# Patient Record
Sex: FEMALE | Race: WHITE | NOT HISPANIC OR LATINO | Employment: FULL TIME | URBAN - NONMETROPOLITAN AREA
[De-identification: names, ages, dates, MRNs, and addresses within clinical notes are randomized per-mention and may not be internally consistent; named-entity substitution may affect disease eponyms.]

---

## 2018-12-02 ENCOUNTER — HOSPITAL ENCOUNTER (OUTPATIENT)
Facility: HOSPITAL | Age: 38
Setting detail: OBSERVATION
Discharge: HOME | End: 2018-12-03
Attending: EMERGENCY MEDICINE | Admitting: INTERNAL MEDICINE
Payer: COMMERCIAL

## 2018-12-02 ENCOUNTER — APPOINTMENT (EMERGENCY)
Dept: CT IMAGING | Facility: HOSPITAL | Age: 38
End: 2018-12-02
Payer: COMMERCIAL

## 2018-12-02 DIAGNOSIS — D72.829 LEUKOCYTOSIS: ICD-10-CM

## 2018-12-02 DIAGNOSIS — R07.9 CHEST PAIN: Primary | ICD-10-CM

## 2018-12-02 DIAGNOSIS — E87.6 HYPOKALEMIA: ICD-10-CM

## 2018-12-02 PROBLEM — M54.50 CHRONIC LOW BACK PAIN: Status: ACTIVE | Noted: 2018-12-02

## 2018-12-02 PROBLEM — G89.29 CHRONIC LOW BACK PAIN: Status: ACTIVE | Noted: 2018-12-02

## 2018-12-02 LAB
ALBUMIN SERPL BCP-MCNC: 4.1 G/DL (ref 3.5–5)
ALP SERPL-CCNC: 67 U/L (ref 46–116)
ALT SERPL W P-5'-P-CCNC: 26 U/L (ref 12–78)
ANION GAP SERPL CALCULATED.3IONS-SCNC: 10 MMOL/L (ref 4–13)
APTT PPP: 29 SECONDS (ref 26–38)
AST SERPL W P-5'-P-CCNC: 16 U/L (ref 5–45)
BASOPHILS # BLD AUTO: 0.1 THOUSANDS/ΜL (ref 0–0.1)
BASOPHILS NFR BLD AUTO: 1 % (ref 0–1)
BILIRUB SERPL-MCNC: 0.2 MG/DL (ref 0.2–1)
BUN SERPL-MCNC: 17 MG/DL (ref 5–25)
CALCIUM SERPL-MCNC: 8.6 MG/DL (ref 8.3–10.1)
CHLORIDE SERPL-SCNC: 102 MMOL/L (ref 100–108)
CO2 SERPL-SCNC: 28 MMOL/L (ref 21–32)
CREAT SERPL-MCNC: 0.93 MG/DL (ref 0.6–1.3)
EOSINOPHIL # BLD AUTO: 0.33 THOUSAND/ΜL (ref 0–0.61)
EOSINOPHIL NFR BLD AUTO: 2 % (ref 0–6)
ERYTHROCYTE [DISTWIDTH] IN BLOOD BY AUTOMATED COUNT: 13.2 % (ref 11.6–15.1)
EXT PREG TEST URINE: NEGATIVE
GFR SERPL CREATININE-BSD FRML MDRD: 79 ML/MIN/1.73SQ M
GLUCOSE SERPL-MCNC: 122 MG/DL (ref 65–140)
HCT VFR BLD AUTO: 39.8 % (ref 34.8–46.1)
HGB BLD-MCNC: 13.3 G/DL (ref 11.5–15.4)
IMM GRANULOCYTES # BLD AUTO: 0.08 THOUSAND/UL (ref 0–0.2)
IMM GRANULOCYTES NFR BLD AUTO: 0 % (ref 0–2)
INR PPP: 0.9 (ref 0.86–1.17)
LIPASE SERPL-CCNC: 164 U/L (ref 73–393)
LYMPHOCYTES # BLD AUTO: 3.62 THOUSANDS/ΜL (ref 0.6–4.47)
LYMPHOCYTES NFR BLD AUTO: 19 % (ref 14–44)
MCH RBC QN AUTO: 30.7 PG (ref 26.8–34.3)
MCHC RBC AUTO-ENTMCNC: 33.4 G/DL (ref 31.4–37.4)
MCV RBC AUTO: 92 FL (ref 82–98)
MONOCYTES # BLD AUTO: 0.83 THOUSAND/ΜL (ref 0.17–1.22)
MONOCYTES NFR BLD AUTO: 4 % (ref 4–12)
NEUTROPHILS # BLD AUTO: 14.09 THOUSANDS/ΜL (ref 1.85–7.62)
NEUTS SEG NFR BLD AUTO: 74 % (ref 43–75)
NRBC BLD AUTO-RTO: 0 /100 WBCS
PLATELET # BLD AUTO: 405 THOUSANDS/UL (ref 149–390)
PMV BLD AUTO: 10.1 FL (ref 8.9–12.7)
POTASSIUM SERPL-SCNC: 3.3 MMOL/L (ref 3.5–5.3)
PROT SERPL-MCNC: 7.6 G/DL (ref 6.4–8.2)
PROTHROMBIN TIME: 11.7 SECONDS (ref 11.8–14.2)
RBC # BLD AUTO: 4.33 MILLION/UL (ref 3.81–5.12)
SODIUM SERPL-SCNC: 140 MMOL/L (ref 136–145)
TROPONIN I SERPL-MCNC: <0.02 NG/ML
WBC # BLD AUTO: 19.05 THOUSAND/UL (ref 4.31–10.16)

## 2018-12-02 PROCEDURE — 85025 COMPLETE CBC W/AUTO DIFF WBC: CPT | Performed by: EMERGENCY MEDICINE

## 2018-12-02 PROCEDURE — 83690 ASSAY OF LIPASE: CPT | Performed by: EMERGENCY MEDICINE

## 2018-12-02 PROCEDURE — 36415 COLL VENOUS BLD VENIPUNCTURE: CPT | Performed by: EMERGENCY MEDICINE

## 2018-12-02 PROCEDURE — 85610 PROTHROMBIN TIME: CPT | Performed by: EMERGENCY MEDICINE

## 2018-12-02 PROCEDURE — 81025 URINE PREGNANCY TEST: CPT | Performed by: EMERGENCY MEDICINE

## 2018-12-02 PROCEDURE — 99285 EMERGENCY DEPT VISIT HI MDM: CPT

## 2018-12-02 PROCEDURE — 84484 ASSAY OF TROPONIN QUANT: CPT | Performed by: EMERGENCY MEDICINE

## 2018-12-02 PROCEDURE — 93005 ELECTROCARDIOGRAM TRACING: CPT

## 2018-12-02 PROCEDURE — 80053 COMPREHEN METABOLIC PANEL: CPT | Performed by: EMERGENCY MEDICINE

## 2018-12-02 PROCEDURE — 71275 CT ANGIOGRAPHY CHEST: CPT

## 2018-12-02 PROCEDURE — 85730 THROMBOPLASTIN TIME PARTIAL: CPT | Performed by: EMERGENCY MEDICINE

## 2018-12-02 PROCEDURE — 74177 CT ABD & PELVIS W/CONTRAST: CPT

## 2018-12-02 PROCEDURE — 96360 HYDRATION IV INFUSION INIT: CPT

## 2018-12-02 PROCEDURE — 99220 PR INITIAL OBSERVATION CARE/DAY 70 MINUTES: CPT | Performed by: NURSE PRACTITIONER

## 2018-12-02 RX ORDER — VITAMIN B COMPLEX
1 CAPSULE ORAL DAILY
COMMUNITY
End: 2020-03-02 | Stop reason: HOSPADM

## 2018-12-02 RX ORDER — ASPIRIN 81 MG/1
324 TABLET, CHEWABLE ORAL ONCE
Status: COMPLETED | OUTPATIENT
Start: 2018-12-02 | End: 2018-12-02

## 2018-12-02 RX ORDER — POTASSIUM CHLORIDE 14.9 MG/ML
20 INJECTION INTRAVENOUS ONCE
Status: COMPLETED | OUTPATIENT
Start: 2018-12-02 | End: 2018-12-03

## 2018-12-02 RX ORDER — ACETAMINOPHEN AND CODEINE PHOSPHATE 300; 30 MG/1; MG/1
1 TABLET ORAL EVERY 4 HOURS PRN
COMMUNITY
End: 2021-04-06

## 2018-12-02 RX ADMIN — SODIUM CHLORIDE 1000 ML: 0.9 INJECTION, SOLUTION INTRAVENOUS at 22:06

## 2018-12-02 RX ADMIN — IOHEXOL 100 ML: 350 INJECTION, SOLUTION INTRAVENOUS at 22:50

## 2018-12-02 RX ADMIN — ASPIRIN 81 MG 324 MG: 81 TABLET ORAL at 21:44

## 2018-12-02 RX ADMIN — NITROGLYCERIN 0.5 INCH: 20 OINTMENT TOPICAL at 21:45

## 2018-12-02 NOTE — LETTER
[facility]  SURGICAL UNIT  [address] [facility] [address]  Dept: [phone]    December 3, 2018     Patient: Fernanda Bravo   YOB: 1980   Date of Visit: 12/2/2018       To Whom it May Concern:    Fernanda Bravo is under my professional care  She was seen in the hospital from 12/2/2018   to 12/03/18  She may return to work on 12/4/18  If you have any questions or concerns, please don't hesitate to call           Sincerely,          Hunter Roca PA-C

## 2018-12-03 ENCOUNTER — APPOINTMENT (OUTPATIENT)
Dept: NON INVASIVE DIAGNOSTICS | Facility: HOSPITAL | Age: 38
End: 2018-12-03
Payer: COMMERCIAL

## 2018-12-03 VITALS
SYSTOLIC BLOOD PRESSURE: 91 MMHG | OXYGEN SATURATION: 97 % | RESPIRATION RATE: 18 BRPM | DIASTOLIC BLOOD PRESSURE: 57 MMHG | WEIGHT: 178.35 LBS | HEART RATE: 60 BPM | BODY MASS INDEX: 32.82 KG/M2 | HEIGHT: 62 IN | TEMPERATURE: 97.2 F

## 2018-12-03 LAB
ANION GAP SERPL CALCULATED.3IONS-SCNC: 7 MMOL/L (ref 4–13)
ATRIAL RATE: 99 BPM
BACTERIA UR QL AUTO: NORMAL /HPF
BASOPHILS # BLD AUTO: 0.07 THOUSANDS/ΜL (ref 0–0.1)
BASOPHILS NFR BLD AUTO: 0 % (ref 0–1)
BILIRUB UR QL STRIP: NEGATIVE
BUN SERPL-MCNC: 16 MG/DL (ref 5–25)
CALCIUM SERPL-MCNC: 7.9 MG/DL (ref 8.3–10.1)
CHLORIDE SERPL-SCNC: 109 MMOL/L (ref 100–108)
CLARITY UR: CLEAR
CO2 SERPL-SCNC: 27 MMOL/L (ref 21–32)
COLOR UR: COLORLESS
CREAT SERPL-MCNC: 0.89 MG/DL (ref 0.6–1.3)
EOSINOPHIL # BLD AUTO: 0.37 THOUSAND/ΜL (ref 0–0.61)
EOSINOPHIL NFR BLD AUTO: 2 % (ref 0–6)
ERYTHROCYTE [DISTWIDTH] IN BLOOD BY AUTOMATED COUNT: 13.3 % (ref 11.6–15.1)
GFR SERPL CREATININE-BSD FRML MDRD: 83 ML/MIN/1.73SQ M
GLUCOSE SERPL-MCNC: 97 MG/DL (ref 65–140)
GLUCOSE UR STRIP-MCNC: NEGATIVE MG/DL
HCT VFR BLD AUTO: 37.8 % (ref 34.8–46.1)
HGB BLD-MCNC: 12 G/DL (ref 11.5–15.4)
HGB UR QL STRIP.AUTO: NEGATIVE
IMM GRANULOCYTES # BLD AUTO: 0.06 THOUSAND/UL (ref 0–0.2)
IMM GRANULOCYTES NFR BLD AUTO: 0 % (ref 0–2)
KETONES UR STRIP-MCNC: NEGATIVE MG/DL
LEUKOCYTE ESTERASE UR QL STRIP: NEGATIVE
LYMPHOCYTES # BLD AUTO: 4.1 THOUSANDS/ΜL (ref 0.6–4.47)
LYMPHOCYTES NFR BLD AUTO: 25 % (ref 14–44)
MAGNESIUM SERPL-MCNC: 2.1 MG/DL (ref 1.6–2.6)
MCH RBC QN AUTO: 30 PG (ref 26.8–34.3)
MCHC RBC AUTO-ENTMCNC: 31.7 G/DL (ref 31.4–37.4)
MCV RBC AUTO: 95 FL (ref 82–98)
MONOCYTES # BLD AUTO: 1.03 THOUSAND/ΜL (ref 0.17–1.22)
MONOCYTES NFR BLD AUTO: 6 % (ref 4–12)
NEUTROPHILS # BLD AUTO: 11 THOUSANDS/ΜL (ref 1.85–7.62)
NEUTS SEG NFR BLD AUTO: 67 % (ref 43–75)
NITRITE UR QL STRIP: NEGATIVE
NON-SQ EPI CELLS URNS QL MICRO: NORMAL /HPF
NRBC BLD AUTO-RTO: 0 /100 WBCS
NT-PROBNP SERPL-MCNC: 26 PG/ML
P AXIS: 60 DEGREES
PH UR STRIP.AUTO: 6 [PH] (ref 4.5–8)
PHOSPHATE SERPL-MCNC: 3 MG/DL (ref 2.7–4.5)
PLATELET # BLD AUTO: 341 THOUSANDS/UL (ref 149–390)
PMV BLD AUTO: 10.2 FL (ref 8.9–12.7)
POTASSIUM SERPL-SCNC: 4.5 MMOL/L (ref 3.5–5.3)
PR INTERVAL: 146 MS
PROCALCITONIN SERPL-MCNC: <0.05 NG/ML
PROT UR STRIP-MCNC: NEGATIVE MG/DL
QRS AXIS: 67 DEGREES
QRSD INTERVAL: 90 MS
QT INTERVAL: 360 MS
QTC INTERVAL: 462 MS
RBC # BLD AUTO: 4 MILLION/UL (ref 3.81–5.12)
RBC #/AREA URNS AUTO: NORMAL /HPF
SODIUM SERPL-SCNC: 143 MMOL/L (ref 136–145)
SP GR UR STRIP.AUTO: 1.01 (ref 1–1.03)
T WAVE AXIS: 59 DEGREES
TROPONIN I SERPL-MCNC: <0.02 NG/ML
TROPONIN I SERPL-MCNC: <0.02 NG/ML
UROBILINOGEN UR QL STRIP.AUTO: 0.2 E.U./DL
VENTRICULAR RATE: 99 BPM
WBC # BLD AUTO: 16.63 THOUSAND/UL (ref 4.31–10.16)
WBC #/AREA URNS AUTO: NORMAL /HPF

## 2018-12-03 PROCEDURE — 83880 ASSAY OF NATRIURETIC PEPTIDE: CPT | Performed by: NURSE PRACTITIONER

## 2018-12-03 PROCEDURE — 87086 URINE CULTURE/COLONY COUNT: CPT | Performed by: INTERNAL MEDICINE

## 2018-12-03 PROCEDURE — 87040 BLOOD CULTURE FOR BACTERIA: CPT | Performed by: INTERNAL MEDICINE

## 2018-12-03 PROCEDURE — 83735 ASSAY OF MAGNESIUM: CPT | Performed by: NURSE PRACTITIONER

## 2018-12-03 PROCEDURE — 84100 ASSAY OF PHOSPHORUS: CPT | Performed by: NURSE PRACTITIONER

## 2018-12-03 PROCEDURE — 85025 COMPLETE CBC W/AUTO DIFF WBC: CPT | Performed by: NURSE PRACTITIONER

## 2018-12-03 PROCEDURE — 84145 PROCALCITONIN (PCT): CPT | Performed by: NURSE PRACTITIONER

## 2018-12-03 PROCEDURE — 81001 URINALYSIS AUTO W/SCOPE: CPT | Performed by: INTERNAL MEDICINE

## 2018-12-03 PROCEDURE — 93306 TTE W/DOPPLER COMPLETE: CPT | Performed by: INTERNAL MEDICINE

## 2018-12-03 PROCEDURE — 93306 TTE W/DOPPLER COMPLETE: CPT

## 2018-12-03 PROCEDURE — 99217 PR OBSERVATION CARE DISCHARGE MANAGEMENT: CPT | Performed by: PHYSICIAN ASSISTANT

## 2018-12-03 PROCEDURE — 80048 BASIC METABOLIC PNL TOTAL CA: CPT | Performed by: NURSE PRACTITIONER

## 2018-12-03 PROCEDURE — 93010 ELECTROCARDIOGRAM REPORT: CPT | Performed by: INTERNAL MEDICINE

## 2018-12-03 PROCEDURE — 84484 ASSAY OF TROPONIN QUANT: CPT | Performed by: NURSE PRACTITIONER

## 2018-12-03 RX ORDER — FAMOTIDINE 20 MG/1
20 TABLET, FILM COATED ORAL DAILY
Status: DISCONTINUED | OUTPATIENT
Start: 2018-12-03 | End: 2018-12-03 | Stop reason: HOSPADM

## 2018-12-03 RX ADMIN — POTASSIUM CHLORIDE 20 MEQ: 200 INJECTION, SOLUTION INTRAVENOUS at 00:31

## 2018-12-03 RX ADMIN — FAMOTIDINE 20 MG: 20 TABLET ORAL at 08:32

## 2018-12-03 RX ADMIN — FAMOTIDINE 20 MG: 10 INJECTION INTRAVENOUS at 00:26

## 2018-12-03 NOTE — H&P
H&P- Willis Austin 1980, 41 y o  female MRN: [id number]    Unit/Bed#: [facility] Encounter: [id number]    Primary Care Provider: Jose Paredes DO   Date and time admitted to hospital: 12/2/2018  9:31 PM        Leukocytosis   Assessment & Plan    Elevated WBC on admission 19 05  Trend CBC  Collect procalcitonin       Chronic low back pain   Assessment & Plan    Chronic low back pain  Morning prior to admission Tylenol No   3  Will replace with Oxy IR 5-10 mg q 6 hours p r n  Provide supportive care       Chest pain   Assessment & Plan    Trend troponin's collect EKGs with troponin  Current troponin 0 02  echo ordered  Provide supportive care             VTE Prophylaxis: low risk ambulate early   / sequential compression device   Code Status: full  POLST: POLST is not applicable to this patient    Anticipated Length of Stay:  Patient will be admitted on an Observation basis with an anticipated length of stay of  Less than  2 midnights  Justification for Hospital Stay: chest pain     Total Time for Visit, including Counseling / Coordination of Care: 1 hour  Greater than 50% of this total time spent on direct patient counseling and coordination of care  Chief Complaint:   Pain in the let thoracic paraspinal radiating to left rib region     History of Present Illness:    Willis Austin is a 41 y o  female who presents with a chief complaint of chest pain  Patient states that she had thoracic pain on the left which radiated around to the left sternal border  Patient has a history of back pain which she takes:  Tylenol No   3  Patient states she took a Tylenol No   3 without relief waited and took a 2nd 1 still no relief sought medical attention emergency room  EKG reviewed normal sinus rhythm normal at 0 02  Patient will be admitted for her chest pain  Images and labs were collected in emergency room  Leukocytosis was present white blood count of 19   Patient denies any fevers chills nausea vomiting diarrhea  Admits to mild nasal stefano ffiness week or so ago  Denies burning with urination frequency or urgency  Review of Systems:    Review of Systems   HENT: Positive for rhinorrhea  Cardiovascular: Positive for chest pain  Musculoskeletal: Positive for back pain (chronic)  All other systems reviewed and are negative  Past Medical and Surgical History:     Past Medical History:   Diagnosis Date    PVC (premature ventricular contraction)        Past Surgical History:   Procedure Laterality Date    ANTERIOR CRUCIATE LIGAMENT REPAIR Left      SECTION      WISDOM TOOTH EXTRACTION         Meds/Allergies:    Prior to Admission medications    Medication Sig Start Date End Date Taking? Authorizing Provider   acetaminophen-codeine (TYLENOL #3) 300-30 mg per tablet Take 1 tablet by mouth every 4 (four) hours as needed for moderate pain   Yes Historical Provider, MD   b complex vitamins capsule Take 1 capsule by mouth daily   Yes Historical Provider, MD   Cholecalciferol (VITAMIN D PO) Take 1 tablet by mouth daily  Yes Historical Provider, MD   ranitidine (ZANTAC) 150 MG capsule Take 150 mg by mouth daily  Yes Historical Provider, MD   ondansetron (ZOFRAN) 4 mg tablet 1-2 every 8 hours as needed for nausea 3/1/16 12/2/18  Janell Guerin MD   rizatriptan (MAXALT) 10 MG tablet Take 1 tablet (10 mg total) by mouth once as needed for migraine for up to 9 doses  3/1/16 12/2/18  Janell Guerin MD     I have reviewed home medications with patient personally      Allergies: No Known Allergies    Social History:     Marital Status: /Civil Union   Occupation:  Noncontributory  Patient Pre-hospital Living Situation: independent  Patient Pre-hospital Level of Mobility: independent  Patient Pre-hospital Diet Restrictions: none  Substance Use History:   History   Alcohol Use    Yes     Comment: rare     History   Smoking Status    Never Smoker   Smokeless Tobacco    Not on file History   Drug Use No       Family History:    non-contributory    Physical Exam:     Vitals:   Blood Pressure: 121/72 (12/02/18 2205)  Pulse: 102 (12/02/18 2205)  Temperature: 98 9 °F (37 2 °C) (12/02/18 2137)  Temp Source: Temporal (12/02/18 2137)  Respirations: 20 (12/02/18 2205)  Weight - Scale: 84 1 kg (185 lb 6 5 oz) (12/02/18 2137)  SpO2: 97 % (12/02/18 2205)    Physical Exam   Constitutional: She is oriented to person, place, and time  She appears well-developed and well-nourished  No distress  HENT:   Head: Normocephalic and atraumatic  Eyes: Pupils are equal, round, and reactive to light  Right eye exhibits no discharge  Left eye exhibits no discharge  No scleral icterus  Neck: Normal range of motion  Neck supple  No JVD present  No tracheal deviation present  No thyromegaly present  Cardiovascular: Normal rate, regular rhythm, normal heart sounds and intact distal pulses  Exam reveals no gallop and no friction rub  No murmur heard  Pulmonary/Chest: Effort normal and breath sounds normal  No stridor  No respiratory distress  She has no wheezes  She has no rales  Abdominal: Soft  Bowel sounds are normal  She exhibits no distension  There is no tenderness  There is no rebound  Musculoskeletal: Normal range of motion  She exhibits no edema, tenderness or deformity  Neurological: She is alert and oriented to person, place, and time  She displays normal reflexes  No cranial nerve deficit  Coordination normal    Skin: Skin is warm and dry  No rash noted  She is not diaphoretic  No erythema  No pallor  Psychiatric: She has a normal mood and affect  Her behavior is normal  Judgment and thought content normal        Additional Data:     Lab Results: I have personally reviewed pertinent reports          Results from last 7 days  Lab Units 12/02/18  2152   WBC Thousand/uL 19 05*   HEMOGLOBIN g/dL 13 3   HEMATOCRIT % 39 8   PLATELETS Thousands/uL 405*   NEUTROS PCT % 74   LYMPHS PCT % 19 MONOS PCT % 4   EOS PCT % 2       Results from last 7 days  Lab Units 12/02/18  2152   POTASSIUM mmol/L 3 3*   CHLORIDE mmol/L 102   CO2 mmol/L 28   BUN mg/dL 17   CREATININE mg/dL 0 93   CALCIUM mg/dL 8 6   ALK PHOS U/L 67   ALT U/L 26   AST U/L 16       Results from last 7 days  Lab Units 12/02/18  2152   INR  0 90       Imaging: I have personally reviewed pertinent reports  Pe Study With Ct Abdomen And Pelvis With Contrast    Result Date: 12/2/2018  Narrative: CT PULMONARY ANGIOGRAM OF THE CHEST AND CT ABDOMEN AND PELVIS WITH INTRAVENOUS CONTRAST INDICATION:   Epigastric/left upper quadrant pain  Pain with deep breath  COMPARISON:  None  TECHNIQUE:  CT examination of the chest, abdomen and pelvis was performed  Thin section CT angiographic technique was used in the chest in order to evaluate for pulmonary embolus and coronal 3D MIP postprocessing was performed on the acquisition scanner  Axial, sagittal, and coronal 2D reformatted images were created from the source data and submitted for interpretation  Radiation dose length product (DLP) for this visit:  834 87 mGy-cm   This examination, like all CT scans performed in the [facility], was performed utilizing techniques to minimize radiation dose exposure, including the use of iterative  reconstruction and automated exposure control  IV Contrast:  100 mL of iohexol (OMNIPAQUE)  was administered intravenously without immediate adverse reaction  Enteric Contrast:  Enteric contrast was not administered  FINDINGS: CHEST PULMONARY ARTERIAL TREE:  No pulmonary embolus is seen  LUNGS:  Lungs are clear  There is no tracheal or endobronchial lesion  PLEURA:  Unremarkable  HEART/AORTA:  Unremarkable for patient's age  MEDIASTINUM AND DENNISE:  Unremarkable  CHEST WALL AND LOWER NECK:   Unremarkable  ABDOMEN LIVER/BILIARY TREE:  Unremarkable  GALLBLADDER:  No calcified gallstones  No pericholecystic inflammatory change    Small amount of enhancement along the tip of the gallbladder fundus (series 2, image 27) may reflect a focus of adenomyomatosis  SPLEEN:  Unremarkable  PANCREAS:  Unremarkable  ADRENAL GLANDS:  Unremarkable  KIDNEYS/URETERS:  One or more simple renal cyst(s) is noted  Otherwise unremarkable kidneys  No hydronephrosis  STOMACH AND BOWEL:  There is distention of the stomach with fluid  No small bowel obstruction  APPENDIX:  A normal appendix was visualized  ABDOMINOPELVIC CAVITY:  No ascites or free intraperitoneal air  No lymphadenopathy  VESSELS:  Unremarkable for patient's age  PELVIS REPRODUCTIVE ORGANS:  Unremarkable for patient's age  URINARY BLADDER:  Unremarkable  ABDOMINAL WALL/INGUINAL REGIONS:  Unremarkable  OSSEOUS STRUCTURES:  No acute fracture or destructive osseous lesion  Impression: Fluid-filled distention of the stomach  Workstation performed: [id number]       EKG, Pathology, and Other Studies Reviewed on Admission:   · EKG: reviewed    Allscripts / Epic Records Reviewed: Yes     ** Please Note: This note has been constructed using a voice recognition system   **

## 2018-12-03 NOTE — ED PROVIDER NOTES
History  Chief Complaint   Patient presents with    Chest Pain     Patient started with bilateral chest/rib pain that radiates into her back and mostly on left side around 530pm  Patient took tylenol with codeine around 8pm with no relief  Patient denies nausea, vomiting and sob at this time  62-year-old female presents complaining of epigastric and left upper quadrant chest discomfort since 17:30  She states that this began after she ate but does not feel that is associated with her eating  Patient currently states that she has pain 7/10 in the epigastric and left upper quadrant region        History provided by:  Patient  Chest Pain   Pain location:  Substernal area, epigastric and L chest  Pain quality: aching    Pain radiates to:  Does not radiate  Pain severity:  Mild  Timing:  Intermittent  Progression:  Waxing and waning  Context: breathing    Context: no drug use and not eating    Relieved by:  Nothing  Worsened by:  Nothing tried  Ineffective treatments:  None tried  Associated symptoms: no abdominal pain, no AICD problem, no altered mental status, no anorexia, no anxiety, no back pain, no dizziness and no headache    Risk factors: no aortic disease and no birth control        Prior to Admission Medications   Prescriptions Last Dose Informant Patient Reported? Taking? Cholecalciferol (VITAMIN D PO)   Yes Yes   Sig: Take 1 tablet by mouth daily  acetaminophen-codeine (TYLENOL #3) 300-30 mg per tablet   Yes Yes   Sig: Take 1 tablet by mouth every 4 (four) hours as needed for moderate pain   b complex vitamins capsule   Yes Yes   Sig: Take 1 capsule by mouth daily   ranitidine (ZANTAC) 150 MG capsule   Yes Yes   Sig: Take 150 mg by mouth daily        Facility-Administered Medications: None       Past Medical History:   Diagnosis Date    PVC (premature ventricular contraction)        Past Surgical History:   Procedure Laterality Date    ANTERIOR CRUCIATE LIGAMENT REPAIR Left      SECTION  WISDOM TOOTH EXTRACTION         No family history on file  I have reviewed and agree with the history as documented  Social History   Substance Use Topics    Smoking status: Never Smoker    Smokeless tobacco: Not on file    Alcohol use Yes      Comment: rare        Review of Systems   Constitutional: Negative for activity change, appetite change and chills  HENT: Negative for congestion, dental problem and drooling  Eyes: Negative for pain, discharge and itching  Respiratory: Negative for apnea, choking and chest tightness  Cardiovascular: Positive for chest pain  Gastrointestinal: Negative for abdominal pain and anorexia  Endocrine: Negative for cold intolerance, heat intolerance and polydipsia  Genitourinary: Negative for difficulty urinating, dyspareunia and dysuria  Musculoskeletal: Negative for back pain  Skin: Negative for color change, pallor and rash  Allergic/Immunologic: Negative for environmental allergies and food allergies  Neurological: Negative for dizziness and headaches  Hematological: Negative for adenopathy  Psychiatric/Behavioral: Negative for agitation, behavioral problems and confusion  All other systems reviewed and are negative  Physical Exam  Physical Exam   Constitutional: She appears well-developed and well-nourished  HENT:   Head: Normocephalic  Right Ear: External ear normal    Left Ear: External ear normal    Eyes: Pupils are equal, round, and reactive to light  Right eye exhibits no discharge  Left eye exhibits no discharge  Neck: Normal range of motion  No tracheal deviation present  No thyromegaly present  Cardiovascular: Normal rate and regular rhythm  Pulmonary/Chest: No respiratory distress  She has no wheezes  She has no rales  Abdominal: She exhibits no distension  There is no tenderness  Musculoskeletal: Normal range of motion  She exhibits no edema or deformity  Neurological: She is alert   She displays normal reflexes  No cranial nerve deficit  Coordination normal    Skin: Skin is warm  Capillary refill takes less than 2 seconds  No rash noted  No erythema  Psychiatric: She has a normal mood and affect  Her behavior is normal    Vitals reviewed        Vital Signs  ED Triage Vitals [12/02/18 2137]   Temperature Pulse Respirations Blood Pressure SpO2   98 9 °F (37 2 °C) (!) 111 18 145/74 99 %      Temp Source Heart Rate Source Patient Position - Orthostatic VS BP Location FiO2 (%)   Temporal Monitor Lying Left arm --      Pain Score       7           Vitals:    12/02/18 2137 12/02/18 2205   BP: 145/74 121/72   Pulse: (!) 111 102   Patient Position - Orthostatic VS: Lying Sitting       Visual Acuity      ED Medications  Medications   potassium chloride 20 mEq IVPB (premix) (not administered)   famotidine (PEPCID) injection 20 mg (not administered)   aspirin chewable tablet 324 mg (324 mg Oral Given 12/2/18 2144)   nitroglycerin (NITRO-BID) 2 % TD ointment 0 5 inch (0 5 inches Topical Given 12/2/18 2145)   sodium chloride 0 9 % bolus 1,000 mL (1,000 mL Intravenous New Bag 12/2/18 2206)   iohexol (OMNIPAQUE) 350 MG/ML injection (SINGLE-DOSE) 100 mL (100 mL Intravenous Given 12/2/18 2250)       Diagnostic Studies  Results Reviewed     Procedure Component Value Units Date/Time    Troponin I [[id number]]  (Normal) Collected:  12/02/18 2152    Lab Status:  Final result Specimen:  Blood from Arm, Right Updated:  12/02/18 2222     Troponin I <0 02 ng/mL     Protime-INR [[id number]]  (Abnormal) Collected:  12/02/18 2152    Lab Status:  Final result Specimen:  Blood from Arm, Right Updated:  12/02/18 2217     Protime 11 7 (L) seconds      INR 0 90    APTT [[id number]]  (Normal) Collected:  12/02/18 2152    Lab Status:  Final result Specimen:  Blood from Arm, Right Updated:  12/02/18 2217     PTT 29 seconds     Comprehensive metabolic panel [[id number]]  (Abnormal) Collected:  12/02/18 2152    Lab Status:  Final result Specimen:  Blood from Arm, Right Updated:  12/02/18 2217     Sodium 140 mmol/L      Potassium 3 3 (L) mmol/L      Chloride 102 mmol/L      CO2 28 mmol/L      ANION GAP 10 mmol/L      BUN 17 mg/dL      Creatinine 0 93 mg/dL      Glucose 122 mg/dL      Calcium 8 6 mg/dL      AST 16 U/L      ALT 26 U/L      Alkaline Phosphatase 67 U/L      Total Protein 7 6 g/dL      Albumin 4 1 g/dL      Total Bilirubin 0 20 mg/dL      eGFR 79 ml/min/1 73sq m     Narrative:         National Kidney Disease Education Program recommendations are as follows:  GFR calculation is accurate only with a steady state creatinine  Chronic Kidney disease less than 60 ml/min/1 73 sq  meters  Kidney failure less than 15 ml/min/1 73 sq  meters      Lipase [[id number]]  (Normal) Collected:  12/02/18 2152    Lab Status:  Final result Specimen:  Blood from Arm, Right Updated:  12/02/18 2214     Lipase 164 u/L     POCT pregnancy, urine [[id number]]  (Normal) Resulted:  12/02/18 2207    Lab Status:  Final result Updated:  12/02/18 2208     EXT PREG TEST UR (Ref: Negative) Negative    CBC and differential [[id number]]  (Abnormal) Collected:  12/02/18 2152    Lab Status:  Final result Specimen:  Blood from Arm, Right Updated:  12/02/18 2204     WBC 19 05 (H) Thousand/uL      RBC 4 33 Million/uL      Hemoglobin 13 3 g/dL      Hematocrit 39 8 %      MCV 92 fL      MCH 30 7 pg      MCHC 33 4 g/dL      RDW 13 2 %      MPV 10 1 fL      Platelets 491 (H) Thousands/uL      nRBC 0 /100 WBCs      Neutrophils Relative 74 %      Immat GRANS % 0 %      Lymphocytes Relative 19 %      Monocytes Relative 4 %      Eosinophils Relative 2 %      Basophils Relative 1 %      Neutrophils Absolute 14 09 (H) Thousands/µL      Immature Grans Absolute 0 08 Thousand/uL      Lymphocytes Absolute 3 62 Thousands/µL      Monocytes Absolute 0 83 Thousand/µL      Eosinophils Absolute 0 33 Thousand/µL      Basophils Absolute 0 10 Thousands/µL                  PE Study with CT Abdomen and Pelvis with contrast   Final Result by Sabrina Morales MD (12/02 2323)      Fluid-filled distention of the stomach  Workstation performed: [id number]                    Procedures  Procedures       Phone Contacts  ED Phone Contact    ED Course         HEART Risk Score      Most Recent Value   History  1 Filed at: 12/02/2018 2146   ECG  1 Filed at: 12/02/2018 2146   Age  0 Filed at: 12/02/2018 2146   Risk Factors  2 Filed at: 12/02/2018 2146   Troponin  0 Filed at: 12/02/2018 2146   Heart Score Risk Calculator   History  1 Filed at: 12/02/2018 2146   ECG  1 Filed at: 12/02/2018 2146   Age  0 Filed at: 12/02/2018 2146   Risk Factors  2 Filed at: 12/02/2018 2146   Troponin  0 Filed at: 12/02/2018 2146   HEART Score  4 Filed at: 12/02/2018 2146   HEART Score  4 Filed at: 12/02/2018 2146                            MDM  Number of Diagnoses or Management Options  Chest pain:   Diagnosis management comments: Differential diagnosis 1  Pulmonary embolism 2  Acute coronary syndrome 3  Pneumonia 4   Pancreatitis   We will perform CTA chest abdomen pelvis             Amount and/or Complexity of Data Reviewed  Clinical lab tests: reviewed  Tests in the radiology section of CPT®: reviewed  Tests in the medicine section of CPT®: reviewed    Risk of Complications, Morbidity, and/or Mortality  Presenting problems: high  Diagnostic procedures: high  Management options: high      CritCare Time    Disposition  Final diagnoses:   Chest pain   Hypokalemia   Leukocytosis     Time reflects when diagnosis was documented in both MDM as applicable and the Disposition within this note     Time User Action Codes Description Comment    12/2/2018  9:38 PM Arlis Fraction Add [R07 9] Chest pain     12/2/2018 11:32 PM Arlis Fraction Add [E87 6] Hypokalemia     12/2/2018 11:32 PM Arlis Fraction Add [H06 674] Leukocytosis       ED Disposition     ED Disposition Condition Comment    Admit          Follow-up Information    None Patient's Medications   Discharge Prescriptions    No medications on file     No discharge procedures on file      ED Provider  Electronically Signed by           Alysa Greenberg DO  12/02/18 3429

## 2018-12-03 NOTE — PLAN OF CARE
Problem: DISCHARGE PLANNING - CARE MANAGEMENT  Goal: Discharge to post-acute care or home with appropriate resources  INTERVENTIONS:  - Conduct assessment to determine patient/family and health care team treatment goals, and need for post-acute services based on payer coverage, community resources, and patient preferences, and barriers to discharge  - Address psychosocial, clinical, and financial barriers to discharge as identified in assessment in conjunction with the patient/family and health care team  - Arrange appropriate level of post-acute services according to patient's   needs and preference and payer coverage in collaboration with the physician and health care team  - Communicate with and update the patient/family, physician, and health care team regarding progress on the discharge plan  - Arrange appropriate transportation to post-acute venues    D/c home with spouse  Outcome: Completed Date Met: 12/03/18

## 2018-12-03 NOTE — PLAN OF CARE
Problem: PAIN - ADULT  Goal: Verbalizes/displays adequate comfort level or baseline comfort level  Interventions:  - Encourage patient to monitor pain and request assistance  - Assess pain using appropriate pain scale  - Administer analgesics based on type and severity of pain and evaluate response  - Implement non-pharmacological measures as appropriate and evaluate response  - Consider cultural and social influences on pain and pain management  - Notify physician/advanced practitioner if interventions unsuccessful or patient reports new pain  Outcome: Progressing      Problem: DISCHARGE PLANNING  Goal: Discharge to home or other facility with appropriate resources  INTERVENTIONS:  - Identify barriers to discharge w/patient and caregiver  - Arrange for needed discharge resources and transportation as appropriate  - Identify discharge learning needs (meds, wound care, etc )  - Arrange for interpretive services to assist at discharge as needed  - Refer to Case Management Department for coordinating discharge planning if the patient needs post-hospital services based on physician/advanced practitioner order or complex needs related to functional status, cognitive ability, or social support system  Outcome: Progressing

## 2018-12-03 NOTE — NURSING NOTE
Patient discharged in stable condition  AVS explained to patient, she verbalized understanding  No additional needs at this time

## 2018-12-03 NOTE — UTILIZATION REVIEW
[address]  Utilization Review Department  Phone: [phone]; Fax [phone]  [email or web address]  ATTENTION: Please call with any questions or concerns to [phone]  and carefully listen to the prompts so that you are directed to the right person  Send all requests for admission clinical reviews, approved or denied determinations and any other requests to fax [phone]  All voicemails are confidential     Initial Clinical Review    Admission: Date/Time/Statement: 12/02/18 @ 2333 -- OBS    Orders Placed This Encounter   Procedures    Place in Observation (expected length of stay for this patient is less than two midnights)     Standing Status:   Standing     Number of Occurrences:   1     Order Specific Question:   Admitting Physician     Answer:   Oneal Pinto     Order Specific Question:   Level of Care     Answer:   Med Surg [16]       ED: Date/Time/Mode of Arrival:   ED Arrival Information     Expected Arrival Acuity Means of Arrival Escorted By Service Admission Type    - 12/2/2018 21:29 Emergent Walk-In Self General Medicine Emergency    Arrival Complaint    chest pain          Chief Complaint:   Chief Complaint   Patient presents with    Chest Pain     Patient started with bilateral chest/rib pain that radiates into her back and mostly on left side around 530pm  Patient took tylenol with codeine around 8pm with no relief  Patient denies nausea, vomiting and sob at this time  History of Illness: 41 y o  female who presents with a chief complaint of chest pain  Patient states that she had thoracic pain on the left which radiated around to the left sternal border  Patient has a history of back pain which she takes:  Tylenol No   3  Patient states she took a Tylenol No   3 without relief waited and took a 2nd 1 still no relief sought medical attention emergency room  EKG reviewed normal sinus rhythm normal at 0 02    Patient will be admitted for her chest pain  Images and labs were collected in emergency room  Leukocytosis was present white blood count of 19  Patient denies any fevers chills nausea vomiting diarrhea  Admits to mild nasal stefano ffiness week or so ago  Denies burning with urination frequency or urgency      ED Vital Signs:   ED Triage Vitals [12/02/18 2137]   Temperature Pulse Respirations Blood Pressure SpO2   98 9 °F (37 2 °C) (!) 111 18 145/74 99 %      Temp Source Heart Rate Source Patient Position - Orthostatic VS BP Location FiO2 (%)   Temporal Monitor Lying Left arm --      Pain Score       7        Wt Readings from Last 1 Encounters:   12/03/18 80 9 kg (178 lb 5 6 oz)       Vital Signs (abnormal): HR , //74    Abnormal Labs/Diagnostic Test Results: WBC 19 05, , K 3 3, Trop - neg  Blood cxs --(P)    CT a/p -- Fluid-filled distention of the stomach    ED Treatment:   Medication Administration from 12/02/2018 2129 to 12/03/2018 0055       Date/Time Order Dose Route Action     12/02/2018 2144 aspirin chewable tablet 324 mg 324 mg Oral Given     12/02/2018 2145 nitroglycerin (NITRO-BID) 2 % TD ointment 0 5 inch 0 5 inch Topical Given     12/02/2018 2206 sodium chloride 0 9 % bolus 1,000 mL 1,000 mL Intravenous New Bag     12/02/2018 2250 iohexol (OMNIPAQUE) 350 MG/ML injection (SINGLE-DOSE) 100 mL 100 mL Intravenous Given     12/03/2018 0031 potassium chloride 20 mEq IVPB (premix) 20 mEq Intravenous New Bag     12/03/2018 0026 famotidine (PEPCID) injection 20 mg 20 mg Intravenous Given          Past Medical/Surgical History:   Past Medical History:   Diagnosis Date    PVC (premature ventricular contraction)        Admitting Diagnosis: Hypokalemia [E87 6]  Leukocytosis [D72 829]  Chest pain [R07 9]    Age/Sex: 41 y o  female    Assessment/Plan:   Chest pain   Assessment & Plan     Trend troponin's collect EKGs with troponin  Current troponin 0 02  echo ordered  Provide supportive care     Leukocytosis Assessment & Plan     Elevated WBC on admission 19 05  Trend CBC  Collect procalcitonin        Chronic low back pain   Assessment & Plan     Chronic low back pain  Morning prior to admission Tylenol No   3  Will replace with Oxy IR 5-10 mg q 6 hours p r n    Provide supportive care         Admission Orders:  Scheduled Meds:   Current Facility-Administered Medications:  famotidine 20 mg Oral Daily     Telem  Serial trops  Repeat EKG  SCD's  Up with assist  Cardiac diet

## 2018-12-03 NOTE — ASSESSMENT & PLAN NOTE
Elevated WBC on admission 19 05  Trended down to 16 63 on the day of admission  No other signs of infection  No need for antibiotics at this time  Outpatient follow-up with PCP for repeat CBC to ensure this resolves  If persistent, will need outpatient follow-up with Hematology

## 2018-12-03 NOTE — ED PROCEDURE NOTE
PROCEDURE  ECG 12 Lead Documentation  Date/Time: 12/2/2018 9:47 PM  Performed by: Gerhard Schulz  Authorized by: Gerhard Schulz     Indications / Diagnosis:  Chest pain   ECG reviewed by me, the ED Provider: yes    Patient location:  ED  Previous ECG:     Previous ECG:  Unavailable  Interpretation:     Interpretation: non-specific    Rate:     ECG rate:  99    ECG rate assessment: normal    Rhythm:     Rhythm: sinus rhythm           Mone Kim DO  12/02/18 2145

## 2018-12-03 NOTE — RESPIRATORY THERAPY NOTE
RT Protocol Note  Colt Jang 41 y o  female MRN: [id number]  Unit/Bed#: 455-78 Encounter: [id number]    Assessment    Active Problems:    Chest pain    Chronic low back pain    Leukocytosis      Home Pulmonary Medications:  Home Devices/Therapy: Other (Comment) (NONE)    Past Medical History:   Diagnosis Date    PVC (premature ventricular contraction)      Social History     Social History    Marital status: /Civil Union     Spouse name: N/A    Number of children: N/A    Years of education: N/A     Social History Main Topics    Smoking status: Never Smoker    Smokeless tobacco: Never Used    Alcohol use Yes      Comment: rare    Drug use: No    Sexual activity: Not Currently     Other Topics Concern    None     Social History Narrative    None       Physical Exam:   Assessment Type: Pre-treatment  General Appearance: Alert, Awake, Eyes open/responds to voice  Respiratory Pattern: Normal  Chest Assessment: Chest expansion symmetrical  Bilateral Breath Sounds: Clear  Cough: None    Vitals:  Blood pressure (!) 102/46, pulse 84, temperature 97 8 °F (36 6 °C), temperature source Temporal, resp  rate 20, height 5' 2" (1 575 m), weight 80 9 kg (178 lb 5 6 oz), last menstrual period 11/22/2018, SpO2 98 %      Imaging and other studies:   U/A    Plan    Respiratory Plan: No distress/Pulmonary history, Discontinue Protocol

## 2018-12-03 NOTE — DISCHARGE SUMMARY
Discharge- Darwin Cruz 1980, 41 y o  female MRN: [id number]    Unit/Bed#: 415-01 Encounter: [id number]    Primary Care Provider: Eduardo Garsia DO   Date and time admitted to hospital: 12/2/2018  9:31 PM        * Chest pain   Assessment & Plan    Resolved last night with no recurrence  Back pain that radiated around to chest   EKG negative for ischemic changes  Troponin's negative x 3 sets  Echocardiogram:  SUMMARY     LEFT VENTRICLE:  Size was normal   Systolic function was normal  Ejection fraction was estimated to be 60 %  There were no regional wall motion abnormalities  Wall thickness was normal   There was no evidence of concentric hypertrophy      TRICUSPID VALVE:  There was trace regurgitation  Outpatient follow-up with PCP  Chronic low back pain   Assessment & Plan    Chronic low back pain  Resume outpatient regimen  Leukocytosis   Assessment & Plan    Elevated WBC on admission 19 05  Trended down to 16 63 on the day of admission  No other signs of infection  No need for antibiotics at this time  Outpatient follow-up with PCP for repeat CBC to ensure this resolves  If persistent, will need outpatient follow-up with Hematology  Discharging Physician / Practitioner: Matilda Kasper PA-C  PCP: Eduardo Garsia DO  Admission Date:   Admission Orders     Ordered        12/02/18 2333  Place in Observation (expected length of stay for this patient is less than two midnights)  Once             Discharge Date: 12/03/18    Resolved Problems  Date Reviewed: 12/3/2018    None          Consultations During Hospital Stay:  · none    Procedures Performed:     Pe Study With Ct Abdomen And Pelvis With Contrast    Result Date: 12/2/2018  · Impression: Fluid-filled distention of the stomach   Workstation performed: [id number]     Significant Findings / Test Results:     · WBC 19 05    Incidental Findings:   · none     Test Results Pending at Discharge (will require follow up):   · none     Outpatient Tests Requested:  · none    Complications:  none    Reason for Admission: chest pain    Hospital Course:     Tree Lopez is a 41 y o  female patient who originally presented to the hospital on 12/2/2018 due to chest pain  Patient states that she had thoracic pain on the left which radiated around to the left sternal border  Patient has a history of back pain which she takes:  Tylenol No   3  Patient states she took a Tylenol No   3 without relief waited and took a 2nd 1 still no relief sought medical attention emergency room  EKG reviewed normal sinus rhythm normal at 0 02  Patient will be admitted for her chest pain  Images and labs were collected in emergency room  Leukocytosis was present white blood count of 19  Patient denies any fevers chills nausea vomiting diarrhea  Admits to mild nasal stuffiness a week or so ago  Denies burning with urination frequency or urgency  Please see above list of diagnoses and related plan for additional information  Condition at Discharge: good     Discharge Day Visit / Exam:     Subjective:    Vitals: Blood Pressure: 91/57 (12/03/18 0721)  Pulse: 60 (12/03/18 0721)  Temperature: (!) 97 2 °F (36 2 °C) (12/03/18 0721)  Temp Source: Temporal (12/03/18 0721)  Respirations: 18 (12/03/18 0721)  Height: 5' 2" (157 5 cm) (12/03/18 0100)  Weight - Scale: 80 9 kg (178 lb 5 6 oz) (12/03/18 0100)  SpO2: 97 % (12/03/18 0721)  Exam:   Physical Exam   Constitutional: She is oriented to person, place, and time  She appears well-developed and well-nourished  HENT:   Head: Normocephalic and atraumatic  Cardiovascular: Normal rate, regular rhythm and normal heart sounds  Pulmonary/Chest: Effort normal and breath sounds normal  No respiratory distress  She has no wheezes  She has no rales  Abdominal: Soft  Bowel sounds are normal  She exhibits no distension  There is no tenderness     Neurological: She is alert and oriented to person, place, and time  No cranial nerve deficit  Skin: Skin is warm and dry  Nursing note and vitals reviewed  Discussion with Family: n/a    Discharge instructions/Information to patient and family:   See after visit summary for information provided to patient and family  Provisions for Follow-Up Care:  See after visit summary for information related to follow-up care and any pertinent home health orders  Disposition:     Home    For Discharges to [address] [facility]:   · Not Applicable to this Patient - Not Applicable to this Patient    Planned Readmission: no     Discharge Statement:  I spent 25 minutes discharging the patient  This time was spent on the day of discharge  I had direct contact with the patient on the day of discharge  Greater than 50% of the total time was spent examining patient, answering all patient questions, arranging and discussing plan of care with patient as well as directly providing post-discharge instructions  Additional time then spent on discharge activities  Discharge Medications:  See after visit summary for reconciled discharge medications provided to patient and family        ** Please Note: This note has been constructed using a voice recognition system **

## 2018-12-03 NOTE — ASSESSMENT & PLAN NOTE
Resolved last night with no recurrence  Back pain that radiated around to chest   EKG negative for ischemic changes  Troponin's negative x 3 sets  Echocardiogram:  SUMMARY     LEFT VENTRICLE:  Size was normal   Systolic function was normal  Ejection fraction was estimated to be 60 %  There were no regional wall motion abnormalities  Wall thickness was normal   There was no evidence of concentric hypertrophy      TRICUSPID VALVE:  There was trace regurgitation  Outpatient follow-up with PCP

## 2018-12-03 NOTE — SOCIAL WORK
Chart reviewed by case management, pt is is independent and lives with her spouse, 's # [phone], pt is independent and lives in a 1 story home with 12-13 basement steps, pt denies any d/c needs  Pt's  will transport the pt home when stable, pt is aware that she is here as an obs status  And obs booklet was given, pt has a rxp adrianna at [employer] , pt deneis smoking and drinks alcohol on occasisons, d/c plan will be discussed at care coordination rounds today, CM reviewed d/c planning process including the following: identifying help at home, patient preference for d/c planning needs, availability of treatment team to discuss questions or concerns patient and/or family may have regarding understanding medications and recognizing signs and symptoms once discharged  CM also encouraged patient to follow up with all recommended appointments after discharge  Patient advised of importance for patient and family to participate in managing patients medical well being

## 2018-12-03 NOTE — ASSESSMENT & PLAN NOTE
Trend troponin's collect EKGs with troponin  Current troponin 0 02  echo ordered  Provide supportive care

## 2018-12-03 NOTE — ASSESSMENT & PLAN NOTE
Chronic low back pain  Morning prior to admission Tylenol No   3  Will replace with Oxy IR 5-10 mg q 6 hours p r n    Provide supportive care

## 2018-12-03 NOTE — SOCIAL WORK
Chart reviewed and d/c order written, pt denies any d/c needs, pt's  will transport the pt home, pt in agreement with the d/c and d/c plan home, d/c plan was discussed at care coordintion rounds today

## 2018-12-05 LAB — BACTERIA UR CULT: NORMAL

## 2018-12-08 LAB — BACTERIA BLD CULT: NORMAL

## 2020-03-01 ENCOUNTER — APPOINTMENT (EMERGENCY)
Dept: ULTRASOUND IMAGING | Facility: HOSPITAL | Age: 40
DRG: 872 | End: 2020-03-01
Payer: COMMERCIAL

## 2020-03-01 ENCOUNTER — APPOINTMENT (EMERGENCY)
Dept: CT IMAGING | Facility: HOSPITAL | Age: 40
DRG: 872 | End: 2020-03-01
Payer: COMMERCIAL

## 2020-03-01 ENCOUNTER — HOSPITAL ENCOUNTER (INPATIENT)
Facility: HOSPITAL | Age: 40
LOS: 1 days | Discharge: HOME | DRG: 872 | End: 2020-03-02
Attending: EMERGENCY MEDICINE | Admitting: FAMILY MEDICINE
Payer: COMMERCIAL

## 2020-03-01 DIAGNOSIS — R11.10 VOMITING AND DIARRHEA: Primary | ICD-10-CM

## 2020-03-01 DIAGNOSIS — R19.7 VOMITING AND DIARRHEA: Primary | ICD-10-CM

## 2020-03-01 DIAGNOSIS — K52.9 ENTEROCOLITIS: ICD-10-CM

## 2020-03-01 PROBLEM — A41.9 SEPSIS, UNSPECIFIED ORGANISM (HCC): Status: ACTIVE | Noted: 2020-03-01

## 2020-03-01 LAB
ALBUMIN SERPL BCP-MCNC: 3.9 G/DL (ref 3.5–5)
ALP SERPL-CCNC: 71 U/L (ref 46–116)
ALT SERPL W P-5'-P-CCNC: 29 U/L (ref 12–78)
ANION GAP SERPL CALCULATED.3IONS-SCNC: 13 MMOL/L (ref 4–13)
AST SERPL W P-5'-P-CCNC: 12 U/L (ref 5–45)
BASOPHILS # BLD AUTO: 0.05 THOUSANDS/ΜL (ref 0–0.1)
BASOPHILS NFR BLD AUTO: 0 % (ref 0–1)
BILIRUB SERPL-MCNC: 0.5 MG/DL (ref 0.2–1)
BILIRUB UR QL STRIP: NEGATIVE
BUN SERPL-MCNC: 16 MG/DL (ref 5–25)
CALCIUM SERPL-MCNC: 8.4 MG/DL (ref 8.3–10.1)
CHLORIDE SERPL-SCNC: 105 MMOL/L (ref 100–108)
CLARITY UR: NORMAL
CO2 SERPL-SCNC: 26 MMOL/L (ref 21–32)
COLOR UR: YELLOW
CREAT SERPL-MCNC: 1.08 MG/DL (ref 0.6–1.3)
EOSINOPHIL # BLD AUTO: 0.08 THOUSAND/ΜL (ref 0–0.61)
EOSINOPHIL NFR BLD AUTO: 0 % (ref 0–6)
ERYTHROCYTE [DISTWIDTH] IN BLOOD BY AUTOMATED COUNT: 13.5 % (ref 11.6–15.1)
EXT PREG TEST URINE: NEGATIVE
EXT. CONTROL ED NAV: NORMAL
GFR SERPL CREATININE-BSD FRML MDRD: 65 ML/MIN/1.73SQ M
GLUCOSE SERPL-MCNC: 135 MG/DL (ref 65–140)
GLUCOSE UR STRIP-MCNC: NEGATIVE MG/DL
HCT VFR BLD AUTO: 45 % (ref 34.8–46.1)
HGB BLD-MCNC: 14.3 G/DL (ref 11.5–15.4)
HGB UR QL STRIP.AUTO: NEGATIVE
IMM GRANULOCYTES # BLD AUTO: 0.07 THOUSAND/UL (ref 0–0.2)
IMM GRANULOCYTES NFR BLD AUTO: 0 % (ref 0–2)
KETONES UR STRIP-MCNC: NEGATIVE MG/DL
LACTATE SERPL-SCNC: 3.6 MMOL/L (ref 0.5–2)
LACTATE SERPL-SCNC: 4.4 MMOL/L (ref 0.5–2)
LEUKOCYTE ESTERASE UR QL STRIP: NEGATIVE
LIPASE SERPL-CCNC: 75 U/L (ref 73–393)
LYMPHOCYTES # BLD AUTO: 1.4 THOUSANDS/ΜL (ref 0.6–4.47)
LYMPHOCYTES NFR BLD AUTO: 6 % (ref 14–44)
MAGNESIUM SERPL-MCNC: 1.8 MG/DL (ref 1.6–2.6)
MCH RBC QN AUTO: 29 PG (ref 26.8–34.3)
MCHC RBC AUTO-ENTMCNC: 31.8 G/DL (ref 31.4–37.4)
MCV RBC AUTO: 91 FL (ref 82–98)
MONOCYTES # BLD AUTO: 0.85 THOUSAND/ΜL (ref 0.17–1.22)
MONOCYTES NFR BLD AUTO: 4 % (ref 4–12)
NEUTROPHILS # BLD AUTO: 20.84 THOUSANDS/ΜL (ref 1.85–7.62)
NEUTS SEG NFR BLD AUTO: 90 % (ref 43–75)
NITRITE UR QL STRIP: NEGATIVE
NRBC BLD AUTO-RTO: 0 /100 WBCS
PH UR STRIP.AUTO: 5.5 [PH]
PLATELET # BLD AUTO: 368 THOUSANDS/UL (ref 149–390)
PLATELET # BLD AUTO: 405 THOUSANDS/UL (ref 149–390)
PMV BLD AUTO: 10 FL (ref 8.9–12.7)
PMV BLD AUTO: 9.9 FL (ref 8.9–12.7)
POTASSIUM SERPL-SCNC: 3.8 MMOL/L (ref 3.5–5.3)
PROT SERPL-MCNC: 7.4 G/DL (ref 6.4–8.2)
PROT UR STRIP-MCNC: NEGATIVE MG/DL
RBC # BLD AUTO: 4.93 MILLION/UL (ref 3.81–5.12)
SODIUM SERPL-SCNC: 144 MMOL/L (ref 136–145)
SP GR UR STRIP.AUTO: <=1.005 (ref 1–1.03)
UROBILINOGEN UR QL STRIP.AUTO: 0.2 E.U./DL
WBC # BLD AUTO: 23.29 THOUSAND/UL (ref 4.31–10.16)

## 2020-03-01 PROCEDURE — 81003 URINALYSIS AUTO W/O SCOPE: CPT | Performed by: EMERGENCY MEDICINE

## 2020-03-01 PROCEDURE — 87040 BLOOD CULTURE FOR BACTERIA: CPT | Performed by: PHYSICIAN ASSISTANT

## 2020-03-01 PROCEDURE — 83605 ASSAY OF LACTIC ACID: CPT | Performed by: PHYSICIAN ASSISTANT

## 2020-03-01 PROCEDURE — 99284 EMERGENCY DEPT VISIT MOD MDM: CPT | Performed by: EMERGENCY MEDICINE

## 2020-03-01 PROCEDURE — 99223 1ST HOSP IP/OBS HIGH 75: CPT | Performed by: FAMILY MEDICINE

## 2020-03-01 PROCEDURE — 83735 ASSAY OF MAGNESIUM: CPT | Performed by: EMERGENCY MEDICINE

## 2020-03-01 PROCEDURE — 82272 OCCULT BLD FECES 1-3 TESTS: CPT

## 2020-03-01 PROCEDURE — 83690 ASSAY OF LIPASE: CPT | Performed by: EMERGENCY MEDICINE

## 2020-03-01 PROCEDURE — 36415 COLL VENOUS BLD VENIPUNCTURE: CPT | Performed by: EMERGENCY MEDICINE

## 2020-03-01 PROCEDURE — 99285 EMERGENCY DEPT VISIT HI MDM: CPT

## 2020-03-01 PROCEDURE — 87493 C DIFF AMPLIFIED PROBE: CPT | Performed by: PHYSICIAN ASSISTANT

## 2020-03-01 PROCEDURE — 76700 US EXAM ABDOM COMPLETE: CPT

## 2020-03-01 PROCEDURE — 85025 COMPLETE CBC W/AUTO DIFF WBC: CPT | Performed by: EMERGENCY MEDICINE

## 2020-03-01 PROCEDURE — 85049 AUTOMATED PLATELET COUNT: CPT | Performed by: PHYSICIAN ASSISTANT

## 2020-03-01 PROCEDURE — 83605 ASSAY OF LACTIC ACID: CPT | Performed by: FAMILY MEDICINE

## 2020-03-01 PROCEDURE — 80053 COMPREHEN METABOLIC PANEL: CPT | Performed by: EMERGENCY MEDICINE

## 2020-03-01 PROCEDURE — 96375 TX/PRO/DX INJ NEW DRUG ADDON: CPT

## 2020-03-01 PROCEDURE — 81025 URINE PREGNANCY TEST: CPT | Performed by: EMERGENCY MEDICINE

## 2020-03-01 PROCEDURE — 74177 CT ABD & PELVIS W/CONTRAST: CPT

## 2020-03-01 PROCEDURE — 96374 THER/PROPH/DIAG INJ IV PUSH: CPT

## 2020-03-01 PROCEDURE — 96361 HYDRATE IV INFUSION ADD-ON: CPT

## 2020-03-01 RX ORDER — CIPROFLOXACIN 2 MG/ML
400 INJECTION, SOLUTION INTRAVENOUS EVERY 12 HOURS
Status: DISCONTINUED | OUTPATIENT
Start: 2020-03-01 | End: 2020-03-02 | Stop reason: HOSPADM

## 2020-03-01 RX ORDER — SODIUM CHLORIDE 9 MG/ML
125 INJECTION, SOLUTION INTRAVENOUS CONTINUOUS
Status: DISCONTINUED | OUTPATIENT
Start: 2020-03-01 | End: 2020-03-02 | Stop reason: HOSPADM

## 2020-03-01 RX ORDER — ONDANSETRON 2 MG/ML
4 INJECTION INTRAMUSCULAR; INTRAVENOUS EVERY 6 HOURS PRN
Status: DISCONTINUED | OUTPATIENT
Start: 2020-03-01 | End: 2020-03-02 | Stop reason: HOSPADM

## 2020-03-01 RX ORDER — ONDANSETRON 2 MG/ML
4 INJECTION INTRAMUSCULAR; INTRAVENOUS ONCE
Status: COMPLETED | OUTPATIENT
Start: 2020-03-01 | End: 2020-03-01

## 2020-03-01 RX ORDER — ACETAMINOPHEN 325 MG/1
650 TABLET ORAL EVERY 6 HOURS PRN
Status: DISCONTINUED | OUTPATIENT
Start: 2020-03-01 | End: 2020-03-02 | Stop reason: HOSPADM

## 2020-03-01 RX ORDER — FAMOTIDINE 20 MG/1
20 TABLET, FILM COATED ORAL DAILY
Status: DISCONTINUED | OUTPATIENT
Start: 2020-03-01 | End: 2020-03-02 | Stop reason: HOSPADM

## 2020-03-01 RX ORDER — METHYLPREDNISOLONE SODIUM SUCCINATE 125 MG/2ML
125 INJECTION, POWDER, LYOPHILIZED, FOR SOLUTION INTRAMUSCULAR; INTRAVENOUS ONCE
Status: COMPLETED | OUTPATIENT
Start: 2020-03-01 | End: 2020-03-01

## 2020-03-01 RX ORDER — LANSOPRAZOLE 15 MG/1
30 CAPSULE, DELAYED RELEASE ORAL DAILY
COMMUNITY

## 2020-03-01 RX ORDER — VITAMIN B COMPLEX
1 CAPSULE ORAL DAILY
Status: DISCONTINUED | OUTPATIENT
Start: 2020-03-01 | End: 2020-03-02 | Stop reason: HOSPADM

## 2020-03-01 RX ORDER — LORATADINE 10 MG/1
10 TABLET ORAL ONCE
Status: COMPLETED | OUTPATIENT
Start: 2020-03-01 | End: 2020-03-01

## 2020-03-01 RX ADMIN — ONDANSETRON 4 MG: 2 INJECTION INTRAMUSCULAR; INTRAVENOUS at 03:31

## 2020-03-01 RX ADMIN — CIPROFLOXACIN 400 MG: 2 INJECTION, SOLUTION INTRAVENOUS at 09:55

## 2020-03-01 RX ADMIN — METRONIDAZOLE 500 MG: 500 INJECTION, SOLUTION INTRAVENOUS at 07:48

## 2020-03-01 RX ADMIN — VANCOMYCIN HYDROCHLORIDE 125 MG: 500 INJECTION, POWDER, LYOPHILIZED, FOR SOLUTION INTRAVENOUS at 07:47

## 2020-03-01 RX ADMIN — METRONIDAZOLE 500 MG: 500 INJECTION, SOLUTION INTRAVENOUS at 16:42

## 2020-03-01 RX ADMIN — SODIUM CHLORIDE 2000 ML: 0.9 INJECTION, SOLUTION INTRAVENOUS at 07:35

## 2020-03-01 RX ADMIN — FAMOTIDINE 20 MG: 10 INJECTION, SOLUTION INTRAVENOUS at 03:25

## 2020-03-01 RX ADMIN — LORATADINE 10 MG: 10 TABLET ORAL at 03:32

## 2020-03-01 RX ADMIN — FAMOTIDINE 20 MG: 20 TABLET ORAL at 09:55

## 2020-03-01 RX ADMIN — IOHEXOL 100 ML: 350 INJECTION, SOLUTION INTRAVENOUS at 05:05

## 2020-03-01 RX ADMIN — SODIUM CHLORIDE 125 ML/HR: 0.9 INJECTION, SOLUTION INTRAVENOUS at 07:55

## 2020-03-01 RX ADMIN — METHYLPREDNISOLONE SODIUM SUCCINATE 125 MG: 125 INJECTION, POWDER, FOR SOLUTION INTRAMUSCULAR; INTRAVENOUS at 03:31

## 2020-03-01 RX ADMIN — METHYLPREDNISOLONE SODIUM SUCCINATE 125 MG: 125 INJECTION, POWDER, FOR SOLUTION INTRAMUSCULAR; INTRAVENOUS at 03:33

## 2020-03-01 RX ADMIN — Medication 1 EACH: at 09:56

## 2020-03-01 RX ADMIN — ENOXAPARIN SODIUM 40 MG: 40 INJECTION SUBCUTANEOUS at 09:56

## 2020-03-01 RX ADMIN — METRONIDAZOLE 500 MG: 500 INJECTION, SOLUTION INTRAVENOUS at 23:08

## 2020-03-01 RX ADMIN — CIPROFLOXACIN 400 MG: 2 INJECTION, SOLUTION INTRAVENOUS at 21:24

## 2020-03-01 RX ADMIN — VANCOMYCIN HYDROCHLORIDE 125 MG: 500 INJECTION, POWDER, LYOPHILIZED, FOR SOLUTION INTRAVENOUS at 12:50

## 2020-03-01 RX ADMIN — VANCOMYCIN HYDROCHLORIDE 125 MG: 500 INJECTION, POWDER, LYOPHILIZED, FOR SOLUTION INTRAVENOUS at 17:56

## 2020-03-01 RX ADMIN — THIAMINE HYDROCHLORIDE 200 MG: 100 INJECTION, SOLUTION INTRAMUSCULAR; INTRAVENOUS at 11:12

## 2020-03-01 RX ADMIN — SODIUM CHLORIDE 1000 ML: 0.9 INJECTION, SOLUTION INTRAVENOUS at 03:34

## 2020-03-01 RX ADMIN — VANCOMYCIN HYDROCHLORIDE 125 MG: 500 INJECTION, POWDER, LYOPHILIZED, FOR SOLUTION INTRAVENOUS at 23:08

## 2020-03-02 VITALS
HEART RATE: 93 BPM | HEIGHT: 61 IN | SYSTOLIC BLOOD PRESSURE: 106 MMHG | TEMPERATURE: 97.8 F | RESPIRATION RATE: 18 BRPM | DIASTOLIC BLOOD PRESSURE: 62 MMHG | BODY MASS INDEX: 37.63 KG/M2 | OXYGEN SATURATION: 98 % | WEIGHT: 199.3 LBS

## 2020-03-02 LAB
ALBUMIN SERPL BCP-MCNC: 2.9 G/DL (ref 3.5–5)
ALP SERPL-CCNC: 55 U/L (ref 46–116)
ALT SERPL W P-5'-P-CCNC: 20 U/L (ref 12–78)
ANION GAP SERPL CALCULATED.3IONS-SCNC: 9 MMOL/L (ref 4–13)
AST SERPL W P-5'-P-CCNC: 11 U/L (ref 5–45)
BILIRUB SERPL-MCNC: 0.2 MG/DL (ref 0.2–1)
BUN SERPL-MCNC: 9 MG/DL (ref 5–25)
C DIFF TOX GENS STL QL NAA+PROBE: NEGATIVE
CALCIUM SERPL-MCNC: 8.2 MG/DL (ref 8.3–10.1)
CHLORIDE SERPL-SCNC: 109 MMOL/L (ref 100–108)
CO2 SERPL-SCNC: 26 MMOL/L (ref 21–32)
CREAT SERPL-MCNC: 0.91 MG/DL (ref 0.6–1.3)
ERYTHROCYTE [DISTWIDTH] IN BLOOD BY AUTOMATED COUNT: 14 % (ref 11.6–15.1)
GFR SERPL CREATININE-BSD FRML MDRD: 80 ML/MIN/1.73SQ M
GLUCOSE SERPL-MCNC: 99 MG/DL (ref 65–140)
HCT VFR BLD AUTO: 35.4 % (ref 34.8–46.1)
HGB BLD-MCNC: 11.4 G/DL (ref 11.5–15.4)
LACTATE SERPL-SCNC: 1.9 MMOL/L (ref 0.5–2)
MAGNESIUM SERPL-MCNC: 1.7 MG/DL (ref 1.6–2.6)
MCH RBC QN AUTO: 29.7 PG (ref 26.8–34.3)
MCHC RBC AUTO-ENTMCNC: 32.2 G/DL (ref 31.4–37.4)
MCV RBC AUTO: 92 FL (ref 82–98)
PHOSPHATE SERPL-MCNC: 2.7 MG/DL (ref 2.7–4.5)
PLATELET # BLD AUTO: 279 THOUSANDS/UL (ref 149–390)
PMV BLD AUTO: 10.1 FL (ref 8.9–12.7)
POTASSIUM SERPL-SCNC: 3.4 MMOL/L (ref 3.5–5.3)
PROT SERPL-MCNC: 6 G/DL (ref 6.4–8.2)
RBC # BLD AUTO: 3.84 MILLION/UL (ref 3.81–5.12)
SODIUM SERPL-SCNC: 144 MMOL/L (ref 136–145)
WBC # BLD AUTO: 13.54 THOUSAND/UL (ref 4.31–10.16)

## 2020-03-02 PROCEDURE — 83605 ASSAY OF LACTIC ACID: CPT | Performed by: PHYSICIAN ASSISTANT

## 2020-03-02 PROCEDURE — 85027 COMPLETE CBC AUTOMATED: CPT | Performed by: PHYSICIAN ASSISTANT

## 2020-03-02 PROCEDURE — 83735 ASSAY OF MAGNESIUM: CPT | Performed by: PHYSICIAN ASSISTANT

## 2020-03-02 PROCEDURE — 84100 ASSAY OF PHOSPHORUS: CPT | Performed by: PHYSICIAN ASSISTANT

## 2020-03-02 PROCEDURE — 80053 COMPREHEN METABOLIC PANEL: CPT | Performed by: PHYSICIAN ASSISTANT

## 2020-03-02 PROCEDURE — 99239 HOSP IP/OBS DSCHRG MGMT >30: CPT | Performed by: FAMILY MEDICINE

## 2020-03-02 RX ORDER — METRONIDAZOLE 500 MG/1
500 TABLET ORAL EVERY 8 HOURS SCHEDULED
Qty: 15 TABLET | Refills: 0 | Status: SHIPPED | OUTPATIENT
Start: 2020-03-02 | End: 2020-03-07

## 2020-03-02 RX ORDER — CIPROFLOXACIN 500 MG/1
500 TABLET, FILM COATED ORAL EVERY 12 HOURS SCHEDULED
Qty: 10 TABLET | Refills: 0 | Status: SHIPPED | OUTPATIENT
Start: 2020-03-02 | End: 2020-03-07

## 2020-03-02 RX ORDER — POTASSIUM CHLORIDE 20 MEQ/1
40 TABLET, EXTENDED RELEASE ORAL ONCE
Status: COMPLETED | OUTPATIENT
Start: 2020-03-02 | End: 2020-03-02

## 2020-03-02 RX ORDER — FLUCONAZOLE 150 MG/1
150 TABLET ORAL ONCE
Qty: 1 TABLET | Refills: 0 | Status: SHIPPED | OUTPATIENT
Start: 2020-03-02 | End: 2020-03-02

## 2020-03-02 RX ADMIN — SODIUM CHLORIDE 125 ML/HR: 0.9 INJECTION, SOLUTION INTRAVENOUS at 02:53

## 2020-03-02 RX ADMIN — Medication 1 EACH: at 09:18

## 2020-03-02 RX ADMIN — ACETAMINOPHEN 650 MG: 325 TABLET, FILM COATED ORAL at 02:55

## 2020-03-02 RX ADMIN — ACETAMINOPHEN 650 MG: 325 TABLET, FILM COATED ORAL at 10:27

## 2020-03-02 RX ADMIN — METRONIDAZOLE 500 MG: 500 INJECTION, SOLUTION INTRAVENOUS at 09:11

## 2020-03-02 RX ADMIN — VANCOMYCIN HYDROCHLORIDE 125 MG: 500 INJECTION, POWDER, LYOPHILIZED, FOR SOLUTION INTRAVENOUS at 05:10

## 2020-03-02 RX ADMIN — FAMOTIDINE 20 MG: 20 TABLET ORAL at 09:11

## 2020-03-02 RX ADMIN — CIPROFLOXACIN 400 MG: 2 INJECTION, SOLUTION INTRAVENOUS at 10:27

## 2020-03-02 RX ADMIN — VANCOMYCIN HYDROCHLORIDE 125 MG: 500 INJECTION, POWDER, LYOPHILIZED, FOR SOLUTION INTRAVENOUS at 12:12

## 2020-03-02 RX ADMIN — ENOXAPARIN SODIUM 40 MG: 40 INJECTION SUBCUTANEOUS at 09:11

## 2020-03-02 RX ADMIN — POTASSIUM CHLORIDE 40 MEQ: 1500 TABLET, EXTENDED RELEASE ORAL at 10:27

## 2020-03-06 LAB
BACTERIA BLD CULT: NORMAL
BACTERIA BLD CULT: NORMAL

## 2020-03-10 ENCOUNTER — TRANSCRIBE ORDERS (OUTPATIENT)
Dept: ADMINISTRATIVE | Facility: HOSPITAL | Age: 40
End: 2020-03-10

## 2020-03-10 DIAGNOSIS — R10.9 ABDOMINAL PAIN, UNSPECIFIED ABDOMINAL LOCATION: Primary | ICD-10-CM

## 2020-03-17 ENCOUNTER — HOSPITAL ENCOUNTER (OUTPATIENT)
Dept: NUCLEAR MEDICINE | Facility: HOSPITAL | Age: 40
Discharge: HOME | End: 2020-03-17
Payer: COMMERCIAL

## 2020-03-17 DIAGNOSIS — R10.9 ABDOMINAL PAIN, UNSPECIFIED ABDOMINAL LOCATION: ICD-10-CM

## 2020-03-17 PROCEDURE — A9537 TC99M MEBROFENIN: HCPCS

## 2020-03-17 PROCEDURE — 78227 HEPATOBIL SYST IMAGE W/DRUG: CPT

## 2020-03-17 RX ADMIN — SINCALIDE 1.8 MCG: 5 INJECTION, POWDER, LYOPHILIZED, FOR SOLUTION INTRAVENOUS at 12:15

## 2020-03-18 ENCOUNTER — CONSULT (OUTPATIENT)
Dept: GASTROENTEROLOGY | Facility: CLINIC | Age: 40
End: 2020-03-18
Payer: COMMERCIAL

## 2020-03-18 ENCOUNTER — APPOINTMENT (OUTPATIENT)
Dept: LAB | Facility: HOSPITAL | Age: 40
End: 2020-03-18
Attending: INTERNAL MEDICINE
Payer: COMMERCIAL

## 2020-03-18 VITALS
TEMPERATURE: 97 F | SYSTOLIC BLOOD PRESSURE: 127 MMHG | HEART RATE: 81 BPM | HEIGHT: 61 IN | DIASTOLIC BLOOD PRESSURE: 78 MMHG | BODY MASS INDEX: 37.31 KG/M2 | WEIGHT: 197.6 LBS

## 2020-03-18 DIAGNOSIS — K52.9 ENTEROCOLITIS: ICD-10-CM

## 2020-03-18 DIAGNOSIS — K82.8 BILIARY DYSKINESIA: ICD-10-CM

## 2020-03-18 DIAGNOSIS — K52.9 ENTEROCOLITIS: Primary | ICD-10-CM

## 2020-03-18 LAB
BASOPHILS # BLD AUTO: 0.06 THOUSANDS/ΜL (ref 0–0.1)
BASOPHILS NFR BLD AUTO: 1 % (ref 0–1)
EOSINOPHIL # BLD AUTO: 0.34 THOUSAND/ΜL (ref 0–0.61)
EOSINOPHIL NFR BLD AUTO: 4 % (ref 0–6)
ERYTHROCYTE [DISTWIDTH] IN BLOOD BY AUTOMATED COUNT: 13.9 % (ref 11.6–15.1)
HCT VFR BLD AUTO: 42.6 % (ref 34.8–46.1)
HGB BLD-MCNC: 13.4 G/DL (ref 11.5–15.4)
IMM GRANULOCYTES # BLD AUTO: 0.03 THOUSAND/UL (ref 0–0.2)
IMM GRANULOCYTES NFR BLD AUTO: 0 % (ref 0–2)
LYMPHOCYTES # BLD AUTO: 2.26 THOUSANDS/ΜL (ref 0.6–4.47)
LYMPHOCYTES NFR BLD AUTO: 26 % (ref 14–44)
MCH RBC QN AUTO: 28.7 PG (ref 26.8–34.3)
MCHC RBC AUTO-ENTMCNC: 31.5 G/DL (ref 31.4–37.4)
MCV RBC AUTO: 91 FL (ref 82–98)
MONOCYTES # BLD AUTO: 0.53 THOUSAND/ΜL (ref 0.17–1.22)
MONOCYTES NFR BLD AUTO: 6 % (ref 4–12)
NEUTROPHILS # BLD AUTO: 5.34 THOUSANDS/ΜL (ref 1.85–7.62)
NEUTS SEG NFR BLD AUTO: 63 % (ref 43–75)
NRBC BLD AUTO-RTO: 0 /100 WBCS
PLATELET # BLD AUTO: 366 THOUSANDS/UL (ref 149–390)
PMV BLD AUTO: 9.8 FL (ref 8.9–12.7)
RBC # BLD AUTO: 4.67 MILLION/UL (ref 3.81–5.12)
WBC # BLD AUTO: 8.56 THOUSAND/UL (ref 4.31–10.16)

## 2020-03-18 PROCEDURE — 85025 COMPLETE CBC W/AUTO DIFF WBC: CPT

## 2020-03-18 PROCEDURE — 82784 ASSAY IGA/IGD/IGG/IGM EACH: CPT

## 2020-03-18 PROCEDURE — 99244 OFF/OP CNSLTJ NEW/EST MOD 40: CPT | Performed by: INTERNAL MEDICINE

## 2020-03-18 PROCEDURE — 86255 FLUORESCENT ANTIBODY SCREEN: CPT

## 2020-03-18 PROCEDURE — 83516 IMMUNOASSAY NONANTIBODY: CPT

## 2020-03-18 PROCEDURE — 36415 COLL VENOUS BLD VENIPUNCTURE: CPT

## 2020-03-18 NOTE — PROGRESS NOTES
[address] [facility] - Outpatient Consultation  Minnie Chavez 46 y o  female MRN: [id number]  Encounter: [id number]          ASSESSMENT AND PLAN:    Minnie Chavez is a 46 y o  female who presents with complaint of recent episodic symptoms of nausea vomiting and diarrhea with CT imaging concerning for enterocolitis  It is possible that this may be related to something infectious, however inflammatory bowel diseases as well on the differential and needs to be evaluated  Some of her symptoms could also be from other causes such as exocrine pancreas insufficiency as well as celiac disease  She also has biliary dyskinesia as seen on prior imaging for which she is interested in undergoing a cholecystectomy    1  Enterocolitis    2  Biliary dyskinesia        Orders Placed This Encounter   Procedures    Calprotectin,Fecal    Fecal fat, qualitative    Pancreatic elastase, fecal    H  pylori antigen, stool    Celiac Disease Antibody Profile    CBC and differential    Ambulatory referral to General Surgery    Colonoscopy     Stool test to evaluate symptoms as above  Celiac blood work as well as a routine CBC  Surgery referral for cholecystectomy  Colonoscopy to evaluate for Crohn's given enterocolitis seen on imaging as well as patient's symptoms  ______________________________________________________________________    HPI:    Minnie Chavez is a 46 y o  female who presents with complaint of vomiting, nausea, and diarrhea     3 instances in the past months  She has vomiting, nausea, diarrhea and a weird tasting burp  The 3rd time she had hives and came to the ED  She had an US,CT scan  She was admitted and it was recommended she undergo a colonoscopy as an outpt at some point  She has some epigastric bloating  No pain, nausea, vomiting recently  Stool is sludge-like, but no BRBPR or black stools  No fatty stools  She has 1-2 BMs per day  No weight loss, fevers, chills   No FH of Crohn's or UC that she is aware of  She had an EGD in the past, 8-10 years ago for indigestion and reflux  She had a hiatal hernia  REVIEW OF SYSTEMS:  10 point ROS reviewed and negative, except as above      Historical Information   Past Medical History:   Diagnosis Date    PVC (premature ventricular contraction)      Past Surgical History:   Procedure Laterality Date    ANTERIOR CRUCIATE LIGAMENT REPAIR Left      SECTION      WISDOM TOOTH EXTRACTION       Social History   Social History     Substance and Sexual Activity   Alcohol Use Yes    Comment: rare     Social History     Substance and Sexual Activity   Drug Use No     Social History     Tobacco Use   Smoking Status Never Smoker   Smokeless Tobacco Never Used     History reviewed  No pertinent family history  Meds/Allergies       Current Outpatient Medications:     Cholecalciferol (VITAMIN D PO)    lansoprazole (PREVACID) 15 mg capsule    acetaminophen-codeine (TYLENOL #3) 300-30 mg per tablet    Na Sulfate-K Sulfate-Mg Sulf (Suprep Bowel Prep Kit) 17 5-3 13-1 6 GM/177ML SOLN    predniSONE 10 mg tablet    ranitidine (ZANTAC) 150 MG capsule    No Known Allergies        Objective     Blood pressure 127/78, pulse 81, temperature (!) 97 °F (36 1 °C), temperature source Tympanic, height 5' 1" (1 549 m), weight 89 6 kg (197 lb 9 6 oz)  Body mass index is 37 34 kg/m²  PHYSICAL EXAM:      General Appearance:   Alert, cooperative, no distress   HEENT:   Normocephalic, atraumatic, anicteric  Neck:  Supple, symmetrical, trachea midline   Lungs:   Clear to auscultation bilaterally; no rales, rhonchi or wheezing; respirations unlabored    Heart[de-identified]   Regular rate and rhythm; no murmur, rub, or gallop     Abdomen:   Soft, non-tender, non-distended; normal bowel sounds; no masses, no organomegaly    Genitalia:   Deferred    Rectal:   Deferred    Extremities:  No cyanosis, clubbing or edema    Pulses:  2+ and symmetric    Skin:  No jaundice, rashes, or lesions    Lymph nodes:  No palpable cervical lymphadenopathy        Lab Results:   Appointment on 03/18/2020   Component Date Value    IgA 03/18/2020 202     Gliadin IgA 03/18/2020 5     Gliadin IgG 03/18/2020 3     Tissue Transglut Ab IGG 03/18/2020 <2     TISSUE TRANSGLUTAMINASE * 03/18/2020 <2     Endomysial IgA 03/18/2020 Negative     WBC 03/18/2020 8 56     RBC 03/18/2020 4 67     Hemoglobin 03/18/2020 13 4     Hematocrit 03/18/2020 42 6     MCV 03/18/2020 91     MCH 03/18/2020 28 7     MCHC 03/18/2020 31 5     RDW 03/18/2020 13 9     MPV 03/18/2020 9 8     Platelets 69/36/0747 366     nRBC 03/18/2020 0     Neutrophils Relative 03/18/2020 63     Immat GRANS % 03/18/2020 0     Lymphocytes Relative 03/18/2020 26     Monocytes Relative 03/18/2020 6     Eosinophils Relative 03/18/2020 4     Basophils Relative 03/18/2020 1     Neutrophils Absolute 03/18/2020 5 34     Immature Grans Absolute 03/18/2020 0 03     Lymphocytes Absolute 03/18/2020 2 26     Monocytes Absolute 03/18/2020 0 53     Eosinophils Absolute 03/18/2020 0 34     Basophils Absolute 03/18/2020 0 06        Lab Results   Component Value Date    WBC 8 56 03/18/2020    HGB 13 4 03/18/2020    HCT 42 6 03/18/2020    MCV 91 03/18/2020     03/18/2020       Lab Results   Component Value Date    SODIUM 144 03/02/2020    K 3 4 (L) 03/02/2020     (H) 03/02/2020    CO2 26 03/02/2020    AGAP 9 03/02/2020    BUN 9 03/02/2020    CREATININE 0 91 03/02/2020    GLUC 99 03/02/2020    CALCIUM 8 2 (L) 03/02/2020    AST 11 03/02/2020    ALT 20 03/02/2020    ALKPHOS 55 03/02/2020    TP 6 0 (L) 03/02/2020    TBILI 0 20 03/02/2020    EGFR 80 03/02/2020       No results found for: CRP    No results found for: CHM0CUPYLISP, TSH    No results found for: IRON, TIBC, FERRITIN    Radiology Results:   Us Abdomen Complete    Result Date: 3/1/2020  Narrative: ABDOMEN ULTRASOUND, COMPLETE INDICATION:   Nausea and vomiting COMPARISON: None TECHNIQUE:   Real-time ultrasound of the abdomen was performed with a curvilinear transducer with both volumetric sweeps and still imaging techniques  FINDINGS: PANCREAS:  Portions of the pancreas are obscured by bowel gas  Visualized portions of the pancreas are unremarkable  AORTA AND IVC:  Visualized portions are normal for patient age  LIVER: Size:  Within normal range  The liver measures 15 cm in the midclavicular line  Contour:  Surface contour is smooth  Parenchyma: There is moderate diffuse increased echogenicity with smooth echotexture and acoustic beam attenuation  Most consistent with moderate hepatic steatosis  No evidence of suspicious mass  Limited imaging of the main portal vein shows it to be patent and hepatopetal  BILIARY: No gallbladder findings  No intrahepatic biliary dilatation  CBD measures 4 mm  No choledocholithiasis  KIDNEY: Right kidney measures 11 6 x 4 1 cm  Within normal limits  Left kidney measures 10 4 x 4 3 cm  There is a cystic lesion in the upper pole measuring 9 x 9 x 9 mm SPLEEN: Measures 10 8 x 11 3 x 3 2 cm  Within normal limits  ASCITES:  None  Impression: Moderate hepatic steatosis Left renal cysts Workstation performed: [id number]     Nm Hepatobiliary W Rx    Result Date: 3/17/2020  Narrative: HEPATOBILIARY SCAN WITH CHOLECYSTOKININ ADMINISTRATION INDICATION: R10 9: Unspecified abdominal pain COMPARISON:  CT abdomen pelvis 3/1/2020 TECHNIQUE:   Following the intravenous administration of 4 9 mCi Tc-99m labeled mebrofenin, dynamic abdominal images were obtained over a 60 minute time period  Images were performed in AP projection  FINDINGS: There is prompt, uniform accumulation with normal clearance of the radiopharmaceutical by the liver  There is normal filling of the intrahepatic ducts, common bile duct and gallbladder with normal excretion of the radiopharmaceutical into the duodenum    In order to evaluate the contractile response of the gallbladder to  cholecystokinin stimulation, 1 8 mcg sincalide (0 02 mcg/kg) was administered by slow intravenous infusion over 60 minutes  These images demonstrate abnormal contraction of the gallbladder  The calculated gallbladder ejection fraction is 11 % (N = >38%)  The patient experienced abdominal discomfort symptoms after CCK administration  Impression: 1  Abnormal contractile response of the gallbladder to cholecystokinin infusion  Findings suspicious for biliary dyskinesia  (Gallbladder EF is 11%) Workstation performed: [id number]     Ct Abdomen Pelvis With Contrast    Result Date: 3/1/2020  Narrative: CT ABDOMEN AND PELVIS WITH IV CONTRAST INDICATION:   Abdominal infection suspected  Nausea, vomiting and diarrhea since 9:00 PM last evening  COMPARISON:  Abdominal ultrasound performed earlier in the morning  TECHNIQUE:  CT examination of the abdomen and pelvis was performed  Axial, sagittal, and coronal 2D reformatted images were created from the source data and submitted for interpretation  Radiation dose length product (DLP) for this visit:  502 75 mGy-cm   This examination, like all CT scans performed in the [facility], was performed utilizing techniques to minimize radiation dose exposure, including the use of iterative  reconstruction and automated exposure control  IV Contrast:  100 mL of iohexol (OMNIPAQUE) Enteric Contrast:  Enteric contrast was not administered  FINDINGS: ABDOMEN LOWER CHEST:  No clinically significant abnormality identified in the visualized lower chest  LIVER/BILIARY TREE:  The liver is mildly enlarged with fatty infiltrative changes  GALLBLADDER:  Normally distended  No calcified gallstones  No pericholecystic inflammation  There is mild asymmetric thickening of the anterolateral wall of the gallbladder (series 2, image 31; series 601, image 36 and series 602, image 138)  SPLEEN:  Unremarkable  PANCREAS:  Unremarkable  ADRENAL GLANDS:  Unremarkable  KIDNEYS/URETERS: No renal calyceal or ureteric calculi  No hydronephrosis or hydroureter  One or more sharply circumscribed subcentimeter renal hypodensities are noted  These lesions are too small to accurately characterize, but are statistically most likely to represent benign cortical renal cyst(s)  According to the guidelines published in  the [employer] Paper of the ACR Incidental Findings Committee (Radiology 2010), no further workup of these lesions is recommended  STOMACH AND BOWEL:  Evaluation of the GI tract somewhat limited due to lack of oral contrast material  Stomach mildly distended and filled with ingested food products and air  There is a hiatal hernia  There is thickening of the distal esophagus, likely sequela of reflux esophagitis  There is mild and diffuse small bowel dilatation  Small bowel distended with fluid  There is circumferential wall thickening throughout the small bowel with enhancement  No evidence of small bowel obstruction  The colon is relatively decompressed  There is segmental distention of the colon with liquid feces  There is mild wall enhancement of the colon  There is mild pericolonic inflammation  APPENDIX:  No findings to suggest appendicitis  ABDOMINOPELVIC CAVITY: Trace free fluid in the pelvis  No large volume abdominal pelvic ascites or free intraperitoneal air  No lymphadenopathy  VESSELS:  Unremarkable for patient's age  PELVIS REPRODUCTIVE ORGANS:  Unremarkable for patient's age  URINARY BLADDER:  Incompletely distended  Inadequately evaluated  ABDOMINAL WALL/INGUINAL REGIONS:  Diastases of the rectus abdominis musculature  Small umbilical hernia containing fat  OSSEOUS STRUCTURES:  No acute fracture or destructive osseous lesion  Impression: 1  Abnormal appearance of the small bowel and colon, most compatible with enterocolitis, either infectious or inflammatory   2   Hiatal hernia with thickening of the distal esophagus, likely sequela of reflux esophagitis  Consider follow-up upper endoscopy  3   Focal area of gallbladder wall thickening along the anterolateral margin of the gallbladder  The findings likely represent focal adenomyomatosis  Less likely would be gallbladder carcinoma  Follow-up MRI of the abdomen with gadolinium is recommended when medically stable  The study was marked in [facility] for immediate notification   Workstation performed: [id number]

## 2020-03-18 NOTE — PATIENT INSTRUCTIONS
Scheduled patient with Dr Tristen Perez on 5/12/2020 @ [facility]  Gave instructions for [facility]-prep  Gave labs and stool studies   Gave referral too Dr Miller Mann

## 2020-03-19 LAB
ENDOMYSIUM IGA SER QL: NEGATIVE
GLIADIN PEPTIDE IGA SER-ACNC: 5 UNITS (ref 0–19)
GLIADIN PEPTIDE IGG SER-ACNC: 3 UNITS (ref 0–19)
IGA SERPL-MCNC: 202 MG/DL (ref 87–352)
TTG IGA SER-ACNC: <2 U/ML (ref 0–3)
TTG IGG SER-ACNC: <2 U/ML (ref 0–5)

## 2020-05-04 ENCOUNTER — TELEPHONE (OUTPATIENT)
Dept: PULMONOLOGY | Facility: CLINIC | Age: 40
End: 2020-05-04

## 2020-05-05 ENCOUNTER — TELEPHONE (OUTPATIENT)
Dept: SURGERY | Facility: CLINIC | Age: 40
End: 2020-05-05

## 2020-05-05 ENCOUNTER — PREP FOR PROCEDURE (OUTPATIENT)
Dept: SURGERY | Facility: CLINIC | Age: 40
End: 2020-05-05

## 2020-05-05 DIAGNOSIS — Z11.59 SCREENING FOR VIRAL DISEASE: Primary | ICD-10-CM

## 2020-05-08 ENCOUNTER — TELEPHONE (OUTPATIENT)
Dept: GASTROENTEROLOGY | Facility: CLINIC | Age: 40
End: 2020-05-08

## 2020-05-14 ENCOUNTER — TELEPHONE (OUTPATIENT)
Dept: GASTROENTEROLOGY | Facility: CLINIC | Age: 40
End: 2020-05-14

## 2020-05-19 ENCOUNTER — TELEPHONE (OUTPATIENT)
Dept: SURGERY | Facility: CLINIC | Age: 40
End: 2020-05-19

## 2021-01-05 ENCOUNTER — NURSE TRIAGE (OUTPATIENT)
Dept: OTHER | Facility: OTHER | Age: 41
End: 2021-01-05

## 2021-01-05 DIAGNOSIS — Z20.822 EXPOSURE TO COVID-19 VIRUS: Primary | ICD-10-CM

## 2021-01-05 DIAGNOSIS — Z20.822 EXPOSURE TO COVID-19 VIRUS: ICD-10-CM

## 2021-01-05 PROCEDURE — U0003 INFECTIOUS AGENT DETECTION BY NUCLEIC ACID (DNA OR RNA); SEVERE ACUTE RESPIRATORY SYNDROME CORONAVIRUS 2 (SARS-COV-2) (CORONAVIRUS DISEASE [COVID-19]), AMPLIFIED PROBE TECHNIQUE, MAKING USE OF HIGH THROUGHPUT TECHNOLOGIES AS DESCRIBED BY CMS-2020-01-R: HCPCS | Performed by: FAMILY MEDICINE

## 2021-01-05 NOTE — TELEPHONE ENCOUNTER
Pt is requesting a covid test and does not have a [facility]'s PCP   Reports having an out of network PCP  Order placed   Pt informed of closest testing site and was advised of hours of operation, address, to wear a mask, and to stay in the car        Pt already has a [employer] account to check for results  Advised pt to call PCP's office to inform of COVID testing so pt can be followed by them  Pt verbalized understanding

## 2021-01-05 NOTE — TELEPHONE ENCOUNTER
Reason for Disposition   [1] COVID-19 infection suspected by caller or triager AND [2] mild symptoms (cough, fever, or others) AND [7] no complications or SOB    Answer Assessment - Initial Assessment Questions  Were you within 6 feet or less, for up to 15 minutes or more with a person that has a confirmed COVID-19 test?        yes     What was the date of your exposure?        12/30/2020     Are you experiencing any symptoms attributed to the virus?  (Assess for SOB, cough, fever, difficulty breathing)        Yes   Congestion, CORDOVA     HIGH RISK: Do you have any history heart or lung conditions, weakened immune system, diabetes, Asthma, CHF, HIV, COPD, Chemo, renal failure, sickle cell, etc?        Denies     PREGNANCY: Are you pregnant or did you recently give birth?        Denies    Protocols used: CORONAVIRUS (COVID-19)  DIAGNOSED OR SUSPECTED-ADULT-[place]

## 2021-01-05 NOTE — TELEPHONE ENCOUNTER
Regarding: COVID Test Request - headache, stuffy - exposure - 1 of 2  ----- Message from Marlene Fabian sent at 1/5/2021 10:42 AM EST -----  "We were exposed to someone who tested positive over the holiday " Pt reports an on and off headache and a little stuffy

## 2021-01-06 LAB — SARS-COV-2 RNA SPEC QL NAA+PROBE: NOT DETECTED

## 2021-02-08 ENCOUNTER — NURSE TRIAGE (OUTPATIENT)
Dept: OTHER | Facility: OTHER | Age: 41
End: 2021-02-08

## 2021-02-08 DIAGNOSIS — Z11.59 SPECIAL SCREENING EXAMINATION FOR UNSPECIFIED VIRAL DISEASE: ICD-10-CM

## 2021-02-08 DIAGNOSIS — Z11.59 SPECIAL SCREENING EXAMINATION FOR UNSPECIFIED VIRAL DISEASE: Primary | ICD-10-CM

## 2021-02-08 PROCEDURE — U0003 INFECTIOUS AGENT DETECTION BY NUCLEIC ACID (DNA OR RNA); SEVERE ACUTE RESPIRATORY SYNDROME CORONAVIRUS 2 (SARS-COV-2) (CORONAVIRUS DISEASE [COVID-19]), AMPLIFIED PROBE TECHNIQUE, MAKING USE OF HIGH THROUGHPUT TECHNOLOGIES AS DESCRIBED BY CMS-2020-01-R: HCPCS | Performed by: FAMILY MEDICINE

## 2021-02-08 PROCEDURE — U0005 INFEC AGEN DETEC AMPLI PROBE: HCPCS | Performed by: FAMILY MEDICINE

## 2021-02-08 NOTE — TELEPHONE ENCOUNTER
Reason for Disposition   [1] COVID-19 infection suspected by caller or triager AND [2] mild symptoms (cough, fever, or others) AND [7] no complications or SOB    Answer Assessment - Initial Assessment Questions  1  COVID-19 DIAGNOSIS: "Who made your Coronavirus (COVID-19) diagnosis?" "Was it confirmed by a positive lab test?" If not diagnosed by a HCP, ask "Are there lots of cases (community spread) where you live?" (See public health department website, if unsure)      symptoms  2  COVID-19 EXPOSURE: "Was there any known exposure to COVID before the symptoms began?" [facility] Definition of close contact: within 6 feet (2 meters) for a total of 15 minutes or more over a 24-hour period  *No Answer*  3  ONSET: "When did the COVID-19 symptoms start?"       2/5/21  4  WORST SYMPTOM: "What is your worst symptom?" (e g , cough, fever, shortness of breath, muscle aches)      *No Answer*  5  COUGH: "Do you have a cough?" If so, ask: "How bad is the cough?"        *No Answer*  6  FEVER: "Do you have a fever?" If so, ask: "What is your temperature, how was it measured, and when did it start?"      *No Answer*  7  RESPIRATORY STATUS: "Describe your breathing?" (e g , shortness of breath, wheezing, unable to speak)       *No Answer*  8  BETTER-SAME-WORSE: "Are you getting better, staying the same or getting worse compared to yesterday?"  If getting worse, ask, "In what way?"      better  9   HIGH RISK DISEASE: "Do you have any chronic medical problems?" (e g , asthma, heart or lung disease, weak immune system, obesity, etc )      *No Answer*  10  PREGNANCY: "Is there any chance you are pregnant?" "When was your last menstrual period?"        *No Answer*  11  OTHER SYMPTOMS: "Do you have any other symptoms?"  (e g , chills, fatigue, headache, loss of smell or taste, muscle pain, sore throat; new loss of smell or taste especially support the diagnosis of COVID-19)        Fever 102 5, congestion, lightheaded, winded loss of smell    Protocols used: CORONAVIRUS (COVID-19) DIAGNOSED OR SUSPECTED-ADULT-AH

## 2021-02-08 NOTE — TELEPHONE ENCOUNTER
Regarding: COVID - Symptomatic - Fever (102 5), loss of smell  ----- Message from Radha Parada sent at 2/8/2021 11:34 AM EST -----  " I've had fever since friday 102 5, loss of smell, dizziness, get winded easily   I would like to get tested "

## 2021-02-09 ENCOUNTER — TELEPHONE (OUTPATIENT)
Dept: OTHER | Facility: OTHER | Age: 41
End: 2021-02-09

## 2021-02-09 LAB — SARS-COV-2 RNA RESP QL NAA+PROBE: POSITIVE

## 2021-02-09 NOTE — TELEPHONE ENCOUNTER
1st attempt  The patient was called for notification of a test result for COVID-19  The patient did not answer the phone and a voicemail was left requesting a call back to [phone], Option 7

## 2021-02-10 NOTE — TELEPHONE ENCOUNTER
Your test for the novel coronavirus, also known as COVID-19, was positive  The sample showed that the virus was present  Positive COVID-19 test results are reportable to the [place] Department of Health  You may receive a call from trained public health staff to conduct an interview  It is important to answer their call  They will ask you to verify who you are  During the call they will ask you about what symptoms you have, what you did before you got sick, and who you were close to while sick  The health department does this to make sure everyone stays healthy and to reduce the spread of the virus  If you would like to verify if the caller does in fact work in contact tracing, call the [address] [place] [address] at [place] ([phone])  For additional information, please visit the [email or web address] website: [email or web address]     If you have any additional questions, we can schedule a virtual visit for you with a provider or call the [facility] [phone], option 7, for care advice    For additional information, please visit the Coronavirus FAQ on the [facility] home page ([email or web address])  Pt has an out of network PCP for follow up

## 2021-04-06 ENCOUNTER — APPOINTMENT (EMERGENCY)
Dept: CT IMAGING | Facility: HOSPITAL | Age: 41
End: 2021-04-06
Payer: COMMERCIAL

## 2021-04-06 ENCOUNTER — APPOINTMENT (EMERGENCY)
Dept: ULTRASOUND IMAGING | Facility: HOSPITAL | Age: 41
End: 2021-04-06
Payer: COMMERCIAL

## 2021-04-06 ENCOUNTER — HOSPITAL ENCOUNTER (OUTPATIENT)
Facility: HOSPITAL | Age: 41
Setting detail: OBSERVATION
Discharge: HOME | End: 2021-04-08
Attending: EMERGENCY MEDICINE | Admitting: FAMILY MEDICINE
Payer: COMMERCIAL

## 2021-04-06 ENCOUNTER — ANESTHESIA EVENT (OUTPATIENT)
Dept: PERIOP | Facility: HOSPITAL | Age: 41
End: 2021-04-06
Payer: COMMERCIAL

## 2021-04-06 DIAGNOSIS — D72.829 LEUKOCYTOSIS: ICD-10-CM

## 2021-04-06 DIAGNOSIS — K81.9 CHOLECYSTITIS: ICD-10-CM

## 2021-04-06 DIAGNOSIS — K80.50 BILIARY COLIC: ICD-10-CM

## 2021-04-06 DIAGNOSIS — R11.2 NAUSEA AND VOMITING: ICD-10-CM

## 2021-04-06 DIAGNOSIS — R10.9 ABDOMINAL PAIN: ICD-10-CM

## 2021-04-06 LAB
ALBUMIN SERPL BCP-MCNC: 4 G/DL (ref 3.5–5)
ALP SERPL-CCNC: 82 U/L (ref 46–116)
ALT SERPL W P-5'-P-CCNC: 43 U/L (ref 12–78)
ANION GAP SERPL CALCULATED.3IONS-SCNC: 8 MMOL/L (ref 4–13)
AST SERPL W P-5'-P-CCNC: 27 U/L (ref 5–45)
BASOPHILS # BLD AUTO: 0.04 THOUSANDS/ΜL (ref 0–0.1)
BASOPHILS NFR BLD AUTO: 0 % (ref 0–1)
BILIRUB SERPL-MCNC: 0.4 MG/DL (ref 0.2–1)
BILIRUB UR QL STRIP: NEGATIVE
BUN SERPL-MCNC: 15 MG/DL (ref 5–25)
CALCIUM SERPL-MCNC: 8.8 MG/DL (ref 8.3–10.1)
CHLORIDE SERPL-SCNC: 105 MMOL/L (ref 100–108)
CLARITY UR: NORMAL
CO2 SERPL-SCNC: 26 MMOL/L (ref 21–32)
COLOR UR: YELLOW
CREAT SERPL-MCNC: 0.83 MG/DL (ref 0.6–1.3)
EOSINOPHIL # BLD AUTO: 0.16 THOUSAND/ΜL (ref 0–0.61)
EOSINOPHIL NFR BLD AUTO: 1 % (ref 0–6)
ERYTHROCYTE [DISTWIDTH] IN BLOOD BY AUTOMATED COUNT: 14.3 % (ref 11.6–15.1)
EXT PREG TEST URINE: NEGATIVE
EXT. CONTROL ED NAV: NORMAL
FLUAV RNA RESP QL NAA+PROBE: NEGATIVE
FLUBV RNA RESP QL NAA+PROBE: NEGATIVE
GFR SERPL CREATININE-BSD FRML MDRD: 89 ML/MIN/1.73SQ M
GLUCOSE SERPL-MCNC: 100 MG/DL (ref 65–140)
GLUCOSE UR STRIP-MCNC: NEGATIVE MG/DL
HCT VFR BLD AUTO: 43.3 % (ref 34.8–46.1)
HGB BLD-MCNC: 14 G/DL (ref 11.5–15.4)
HGB UR QL STRIP.AUTO: NEGATIVE
IMM GRANULOCYTES # BLD AUTO: 0.1 THOUSAND/UL (ref 0–0.2)
IMM GRANULOCYTES NFR BLD AUTO: 1 % (ref 0–2)
KETONES UR STRIP-MCNC: NEGATIVE MG/DL
LEUKOCYTE ESTERASE UR QL STRIP: NEGATIVE
LIPASE SERPL-CCNC: 89 U/L (ref 73–393)
LYMPHOCYTES # BLD AUTO: 2.37 THOUSANDS/ΜL (ref 0.6–4.47)
LYMPHOCYTES NFR BLD AUTO: 13 % (ref 14–44)
MCH RBC QN AUTO: 28.4 PG (ref 26.8–34.3)
MCHC RBC AUTO-ENTMCNC: 32.3 G/DL (ref 31.4–37.4)
MCV RBC AUTO: 88 FL (ref 82–98)
MONOCYTES # BLD AUTO: 1.07 THOUSAND/ΜL (ref 0.17–1.22)
MONOCYTES NFR BLD AUTO: 6 % (ref 4–12)
NEUTROPHILS # BLD AUTO: 14.62 THOUSANDS/ΜL (ref 1.85–7.62)
NEUTS SEG NFR BLD AUTO: 79 % (ref 43–75)
NITRITE UR QL STRIP: NEGATIVE
NRBC BLD AUTO-RTO: 0 /100 WBCS
PH UR STRIP.AUTO: 5.5 [PH]
PLATELET # BLD AUTO: 430 THOUSANDS/UL (ref 149–390)
PMV BLD AUTO: 10 FL (ref 8.9–12.7)
POTASSIUM SERPL-SCNC: 4.2 MMOL/L (ref 3.5–5.3)
PROT SERPL-MCNC: 8 G/DL (ref 6.4–8.2)
PROT UR STRIP-MCNC: NEGATIVE MG/DL
RBC # BLD AUTO: 4.93 MILLION/UL (ref 3.81–5.12)
RSV RNA RESP QL NAA+PROBE: NEGATIVE
SARS-COV-2 RNA RESP QL NAA+PROBE: POSITIVE
SODIUM SERPL-SCNC: 139 MMOL/L (ref 136–145)
SP GR UR STRIP.AUTO: 1.01 (ref 1–1.03)
UROBILINOGEN UR QL STRIP.AUTO: 0.2 E.U./DL
WBC # BLD AUTO: 18.36 THOUSAND/UL (ref 4.31–10.16)

## 2021-04-06 PROCEDURE — 99219 PR INITIAL OBSERVATION CARE/DAY 50 MINUTES: CPT | Performed by: FAMILY MEDICINE

## 2021-04-06 PROCEDURE — 81025 URINE PREGNANCY TEST: CPT | Performed by: EMERGENCY MEDICINE

## 2021-04-06 PROCEDURE — 85025 COMPLETE CBC W/AUTO DIFF WBC: CPT | Performed by: EMERGENCY MEDICINE

## 2021-04-06 PROCEDURE — 96374 THER/PROPH/DIAG INJ IV PUSH: CPT

## 2021-04-06 PROCEDURE — 96375 TX/PRO/DX INJ NEW DRUG ADDON: CPT

## 2021-04-06 PROCEDURE — 81003 URINALYSIS AUTO W/O SCOPE: CPT | Performed by: EMERGENCY MEDICINE

## 2021-04-06 PROCEDURE — 99245 OFF/OP CONSLTJ NEW/EST HI 55: CPT | Performed by: SURGERY

## 2021-04-06 PROCEDURE — 36415 COLL VENOUS BLD VENIPUNCTURE: CPT | Performed by: EMERGENCY MEDICINE

## 2021-04-06 PROCEDURE — 80053 COMPREHEN METABOLIC PANEL: CPT | Performed by: EMERGENCY MEDICINE

## 2021-04-06 PROCEDURE — 74177 CT ABD & PELVIS W/CONTRAST: CPT

## 2021-04-06 PROCEDURE — 96361 HYDRATE IV INFUSION ADD-ON: CPT

## 2021-04-06 PROCEDURE — 99285 EMERGENCY DEPT VISIT HI MDM: CPT

## 2021-04-06 PROCEDURE — G1004 CDSM NDSC: HCPCS

## 2021-04-06 PROCEDURE — 76705 ECHO EXAM OF ABDOMEN: CPT

## 2021-04-06 PROCEDURE — 99284 EMERGENCY DEPT VISIT MOD MDM: CPT | Performed by: EMERGENCY MEDICINE

## 2021-04-06 PROCEDURE — 83690 ASSAY OF LIPASE: CPT | Performed by: EMERGENCY MEDICINE

## 2021-04-06 PROCEDURE — 0241U HB NFCT DS VIR RESP RNA 4 TRGT: CPT | Performed by: FAMILY MEDICINE

## 2021-04-06 RX ORDER — ONDANSETRON 2 MG/ML
4 INJECTION INTRAMUSCULAR; INTRAVENOUS ONCE
Status: COMPLETED | OUTPATIENT
Start: 2021-04-06 | End: 2021-04-06

## 2021-04-06 RX ORDER — MORPHINE SULFATE 4 MG/ML
4 INJECTION, SOLUTION INTRAMUSCULAR; INTRAVENOUS EVERY 4 HOURS PRN
Status: DISCONTINUED | OUTPATIENT
Start: 2021-04-06 | End: 2021-04-08 | Stop reason: HOSPADM

## 2021-04-06 RX ORDER — ONDANSETRON 2 MG/ML
4 INJECTION INTRAMUSCULAR; INTRAVENOUS EVERY 4 HOURS PRN
Status: DISCONTINUED | OUTPATIENT
Start: 2021-04-06 | End: 2021-04-06

## 2021-04-06 RX ORDER — SODIUM CHLORIDE 9 MG/ML
100 INJECTION, SOLUTION INTRAVENOUS CONTINUOUS
Status: DISCONTINUED | OUTPATIENT
Start: 2021-04-06 | End: 2021-04-08 | Stop reason: HOSPADM

## 2021-04-06 RX ORDER — KETOROLAC TROMETHAMINE 30 MG/ML
15 INJECTION, SOLUTION INTRAMUSCULAR; INTRAVENOUS ONCE
Status: COMPLETED | OUTPATIENT
Start: 2021-04-06 | End: 2021-04-06

## 2021-04-06 RX ORDER — HYDROMORPHONE HCL/PF 1 MG/ML
0.5 SYRINGE (ML) INJECTION EVERY 4 HOURS PRN
Status: DISCONTINUED | OUTPATIENT
Start: 2021-04-06 | End: 2021-04-06

## 2021-04-06 RX ORDER — CIPROFLOXACIN 2 MG/ML
400 INJECTION, SOLUTION INTRAVENOUS EVERY 12 HOURS
Status: DISCONTINUED | OUTPATIENT
Start: 2021-04-06 | End: 2021-04-07

## 2021-04-06 RX ORDER — PANTOPRAZOLE SODIUM 20 MG/1
20 TABLET, DELAYED RELEASE ORAL
Status: DISCONTINUED | OUTPATIENT
Start: 2021-04-07 | End: 2021-04-08 | Stop reason: HOSPADM

## 2021-04-06 RX ORDER — ONDANSETRON 2 MG/ML
4 INJECTION INTRAMUSCULAR; INTRAVENOUS EVERY 6 HOURS PRN
Status: DISCONTINUED | OUTPATIENT
Start: 2021-04-06 | End: 2021-04-08 | Stop reason: HOSPADM

## 2021-04-06 RX ORDER — HEPARIN SODIUM 5000 [USP'U]/ML
5000 INJECTION, SOLUTION INTRAVENOUS; SUBCUTANEOUS EVERY 8 HOURS SCHEDULED
Status: DISCONTINUED | OUTPATIENT
Start: 2021-04-06 | End: 2021-04-08 | Stop reason: HOSPADM

## 2021-04-06 RX ORDER — CEFAZOLIN SODIUM 2 G/50ML
2000 SOLUTION INTRAVENOUS EVERY 8 HOURS
Status: DISCONTINUED | OUTPATIENT
Start: 2021-04-06 | End: 2021-04-06

## 2021-04-06 RX ORDER — CEFAZOLIN SODIUM 2 G/50ML
2000 SOLUTION INTRAVENOUS
Status: COMPLETED | OUTPATIENT
Start: 2021-04-07 | End: 2021-04-07

## 2021-04-06 RX ADMIN — MORPHINE SULFATE 2 MG: 2 INJECTION, SOLUTION INTRAMUSCULAR; INTRAVENOUS at 20:32

## 2021-04-06 RX ADMIN — CEFAZOLIN SODIUM 2000 MG: 2 SOLUTION INTRAVENOUS at 12:08

## 2021-04-06 RX ADMIN — ONDANSETRON 4 MG: 2 INJECTION INTRAMUSCULAR; INTRAVENOUS at 09:17

## 2021-04-06 RX ADMIN — SODIUM CHLORIDE 1000 ML: 0.9 INJECTION, SOLUTION INTRAVENOUS at 09:16

## 2021-04-06 RX ADMIN — IOHEXOL 100 ML: 350 INJECTION, SOLUTION INTRAVENOUS at 10:16

## 2021-04-06 RX ADMIN — SODIUM CHLORIDE 100 ML/HR: 0.9 INJECTION, SOLUTION INTRAVENOUS at 14:15

## 2021-04-06 RX ADMIN — KETOROLAC TROMETHAMINE 15 MG: 30 INJECTION, SOLUTION INTRAMUSCULAR; INTRAVENOUS at 09:17

## 2021-04-06 RX ADMIN — SODIUM CHLORIDE 100 ML/HR: 0.9 INJECTION, SOLUTION INTRAVENOUS at 23:45

## 2021-04-06 RX ADMIN — METRONIDAZOLE 500 MG: 500 INJECTION, SOLUTION INTRAVENOUS at 22:00

## 2021-04-06 RX ADMIN — HEPARIN SODIUM 5000 UNITS: 5000 INJECTION INTRAVENOUS; SUBCUTANEOUS at 22:00

## 2021-04-06 RX ADMIN — HEPARIN SODIUM 5000 UNITS: 5000 INJECTION INTRAVENOUS; SUBCUTANEOUS at 14:36

## 2021-04-06 RX ADMIN — MORPHINE SULFATE 4 MG: 4 INJECTION, SOLUTION INTRAMUSCULAR; INTRAVENOUS at 14:36

## 2021-04-06 RX ADMIN — CIPROFLOXACIN 400 MG: 2 INJECTION, SOLUTION INTRAVENOUS at 23:35

## 2021-04-06 RX ADMIN — SODIUM CHLORIDE 1000 ML: 0.9 INJECTION, SOLUTION INTRAVENOUS at 09:56

## 2021-04-06 NOTE — ASSESSMENT & PLAN NOTE
Patient with epigastric/RUQ pain and nausea/vomiting  RUQ Ultrasound without acute pathology  Previous HIDA revealed biliary dyskinesia  Received Ancef and Flagyl, will hold off on additional antibiotics  Supportive care in the form of IVF, antiemetics, pain control   NPO  General surgery consultation appreciated- plan for laparoscopic cholecystectomy today 4/7

## 2021-04-06 NOTE — H&P
H&P Exam - Alexander Cardinal 45 y o  female MRN: [id number]    Unit/Bed#: RM01 Encounter: [id number]    Biliary colic  Assessment & Plan    NPO status  RUQ without acute pathology  Previous HIDA revealed biliary dyskinesia  Surgical consult  Received Ancef and Flagyl, will hold off on additional antibiotics  Supportive care in the form of IVF, antiemetics, pain control         History of Present Illness      67-year-old female with a past medical history significant for biliary dyskinesia presents to ED with epigastric pain associated with nausea and 1 episode of emesis this morning  Patient had loose stool x 1 last evening  States she was due to have her GB removed last year but covid was prohibitive  Denies fevers/chills/chest pain / SOB    Review of Systems   Gastrointestinal: Positive for abdominal pain, diarrhea, nausea and vomiting  All other systems reviewed and are negative        Historical Information   Past Medical History:   Diagnosis Date    PVC (premature ventricular contraction)      Past Surgical History:   Procedure Laterality Date    ANTERIOR CRUCIATE LIGAMENT REPAIR Left      SECTION      WISDOM TOOTH EXTRACTION       Social History   Social History     Substance and Sexual Activity   Alcohol Use Yes    Comment: rare     Social History     Substance and Sexual Activity   Drug Use No     Social History     Tobacco Use   Smoking Status Never Smoker   Smokeless Tobacco Never Used     E-Cigarette Use: Never User     E-Cigarette/Vaping Substances       Family History: non-contributory    Meds/Allergies   all medications and allergies reviewed  No Known Allergies    Objective   First Vitals:   Blood Pressure: 135/74 (21)  Pulse: 94 (21)  Temperature: 97 7 °F (36 5 °C) (21)  Temp Source: Temporal (21)  Respirations: 18 (21)  Height: 5' 1" (154 9 cm) (21)  Weight - Scale: 90 7 kg (199 lb 15 3 oz) (21)  SpO2: 98 % (04/06/21 3163)    Current Vitals:   Blood Pressure: 113/81 (04/06/21 1130)  Pulse: 85 (04/06/21 1130)  Temperature: 97 7 °F (36 5 °C) (04/06/21 0824)  Temp Source: Temporal (04/06/21 0824)  Respirations: 18 (04/06/21 1130)  Height: 5' 1" (154 9 cm) (04/06/21 0824)  Weight - Scale: 90 7 kg (199 lb 15 3 oz) (04/06/21 0824)  SpO2: 98 % (04/06/21 1130)      Intake/Output Summary (Last 24 hours) at 4/6/2021 1224  Last data filed at 4/6/2021 0956  Gross per 24 hour   Intake 1000 ml   Output --   Net 1000 ml       Invasive Devices     Peripheral Intravenous Line            Peripheral IV 04/06/21 Left Antecubital less than 1 day                Physical Exam  Vitals signs and nursing note reviewed  Constitutional:       Appearance: Normal appearance  HENT:      Head: Normocephalic and atraumatic  Mouth/Throat:      Pharynx: Oropharynx is clear  Eyes:      Pupils: Pupils are equal, round, and reactive to light  Neck:      Musculoskeletal: Normal range of motion  Cardiovascular:      Rate and Rhythm: Normal rate and regular rhythm  Pulmonary:      Effort: Pulmonary effort is normal  No respiratory distress  Abdominal:      General: Abdomen is flat  Palpations: Abdomen is soft  Tenderness: There is abdominal tenderness  Musculoskeletal: Normal range of motion  General: No swelling  Skin:     General: Skin is warm  Capillary Refill: Capillary refill takes 2 to 3 seconds  Coloration: Skin is not jaundiced  Neurological:      General: No focal deficit present  Mental Status: She is alert           Lab Results:   Lab Results   Component Value Date    WBC 18 36 (H) 04/06/2021    HGB 14 0 04/06/2021    HCT 43 3 04/06/2021    MCV 88 04/06/2021     (H) 04/06/2021     Lab Results   Component Value Date    SODIUM 139 04/06/2021    K 4 2 04/06/2021     04/06/2021    CO2 26 04/06/2021    AGAP 8 04/06/2021    BUN 15 04/06/2021    CREATININE 0 83 04/06/2021    GLUC 100 04/06/2021    CALCIUM 8 8 04/06/2021    AST 27 04/06/2021    ALT 43 04/06/2021    ALKPHOS 82 04/06/2021    TP 8 0 04/06/2021    TBILI 0 40 04/06/2021    EGFR 89 04/06/2021       Imaging:   US gallbladder   Final Result      No sonographic evidence for cholelithiasis or acute cholecystitis  Workstation performed: [id number]         CT abdomen pelvis with contrast   Final Result      Findings in the gallbladder suggestive of cholecystitis  Consider ultrasound for further evaluation              Workstation performed: [id number]             EKG, Pathology, and Other Studies: NSR    Code Status: Prior  Advance Directive and Living Will:      Power of :    POLST:      Counseling / Coordination of Care:

## 2021-04-06 NOTE — ANESTHESIA PREPROCEDURE EVALUATION
Procedure:  CHOLECYSTECTOMY LAPAROSCOPIC, possible open (N/A Abdomen)    Relevant Problems   CARDIO   (+) Chest pain      MUSCULOSKELETAL   (+) Chronic low back pain        Physical Exam    Airway    Mallampati score: II  TM Distance: >3 FB  Neck ROM: full     Dental       Cardiovascular  Rhythm: regular,     Pulmonary  Breath sounds clear to auscultation,     Other Findings        Anesthesia Plan  ASA Score- 2     Anesthesia Type- general with ASA Monitors  Additional Monitors:   Airway Plan: ETT  Plan Factors-    Chart reviewed  Patient summary reviewed  Induction- intravenous  Postoperative Plan- Plan for postoperative opioid use  Informed Consent- Anesthetic plan and risks discussed with patient  I personally reviewed this patient with the CRNA  Discussed and agreed on the Anesthesia Plan with the CRNA  Marcela Up

## 2021-04-06 NOTE — ED NOTES
Feeling better  oob ambulated to bathroom   Iv flds continued     Estephania Angel RN  04/06/21 7344

## 2021-04-06 NOTE — ED PROVIDER NOTES
History  Chief Complaint   Patient presents with    Vomiting     patient reports "I think I'm having a gall attack"  she reports pain vomiting and diarrhea beginning this morning  Patient is a 67-year-old female with history of previous episodes of gallbladder pain, biliary colic  States she was in discussions to have her gallbladder surgically removed last year prior to the [facility] pandemic  She states she has been fairly well controlled in terms of her gallbladder "attacks"  States she began having epigastric pain and right upper quadrant pain last evening followed by vomiting and pain this morning  Vomiting has since subsided  Pain is looking the right upper quadrant  No radiation of pain  No chest pain or shortness of breath  No recent illness or injury  No change in medications  Denies any alcohol or drug abuse  No recent travel  Denies any fever chills  No change in dietary habits  History provided by:  Patient      Prior to Admission Medications   Prescriptions Last Dose Informant Patient Reported? Taking? Cholecalciferol (VITAMIN D PO)  Self Yes Yes   Sig: Take 5,000 Units by mouth daily    b complex vitamins tablet   Yes Yes   Sig: Take 1 tablet by mouth daily   lansoprazole (PREVACID) 15 mg capsule  Self Yes Yes   Sig: Take 15 mg by mouth daily      Facility-Administered Medications: None       Past Medical History:   Diagnosis Date    PVC (premature ventricular contraction)        Past Surgical History:   Procedure Laterality Date    ANTERIOR CRUCIATE LIGAMENT REPAIR Left      SECTION      WISDOM TOOTH EXTRACTION         History reviewed  No pertinent family history  I have reviewed and agree with the history as documented      E-Cigarette/Vaping    E-Cigarette Use Never User      E-Cigarette/Vaping Substances     Social History     Tobacco Use    Smoking status: Never Smoker    Smokeless tobacco: Never Used   Substance Use Topics    Alcohol use: Yes     Comment: rare    Drug use: No       Review of Systems   Constitutional: Negative for appetite change, chills, fatigue, fever and unexpected weight change  HENT: Negative for congestion, ear pain, rhinorrhea and sore throat  Eyes: Negative for pain and visual disturbance  Respiratory: Negative for cough, chest tightness, shortness of breath and wheezing  Cardiovascular: Negative for chest pain, palpitations and leg swelling  Gastrointestinal: Positive for abdominal pain, diarrhea, nausea and vomiting  Negative for constipation  Genitourinary: Negative for difficulty urinating, dysuria, frequency, hematuria, menstrual problem, pelvic pain, vaginal bleeding and vaginal discharge  Musculoskeletal: Negative for arthralgias, back pain and neck pain  Skin: Negative for color change and rash  Neurological: Negative for dizziness, seizures, syncope, light-headedness and headaches  Psychiatric/Behavioral: Negative for confusion and sleep disturbance  All other systems reviewed and are negative  Physical Exam  Physical Exam  Vitals signs and nursing note reviewed  Constitutional:       General: She is not in acute distress  Appearance: Normal appearance  She is well-developed  She is not ill-appearing, toxic-appearing or diaphoretic  HENT:      Head: Normocephalic and atraumatic  Nose: Nose normal       Mouth/Throat:      Mouth: Mucous membranes are moist    Eyes:      General: No scleral icterus  Extraocular Movements: Extraocular movements intact  Conjunctiva/sclera: Conjunctivae normal    Neck:      Musculoskeletal: Normal range of motion and neck supple  No muscular tenderness  Cardiovascular:      Rate and Rhythm: Normal rate and regular rhythm  Pulses: Normal pulses  Heart sounds: Normal heart sounds  No murmur  No friction rub  No gallop  Pulmonary:      Effort: Pulmonary effort is normal  No respiratory distress  Breath sounds: Normal breath sounds   No wheezing or rales  Abdominal:      Palpations: Abdomen is soft  There is no mass  Tenderness: There is abdominal tenderness in the right upper quadrant  There is no guarding or rebound  Hernia: No hernia is present  Comments: No zoster rash   Musculoskeletal: Normal range of motion  General: No swelling, deformity or signs of injury  Right lower leg: No edema  Left lower leg: No edema  Lymphadenopathy:      Cervical: No cervical adenopathy  Skin:     General: Skin is warm and dry  Capillary Refill: Capillary refill takes less than 2 seconds  Coloration: Skin is not jaundiced or pale  Findings: No bruising, erythema, lesion or rash  Neurological:      General: No focal deficit present  Mental Status: She is alert and oriented to person, place, and time     Psychiatric:         Mood and Affect: Mood normal          Behavior: Behavior normal          Vital Signs  ED Triage Vitals [04/06/21 0824]   Temperature Pulse Respirations Blood Pressure SpO2   97 7 °F (36 5 °C) 94 18 135/74 98 %      Temp Source Heart Rate Source Patient Position - Orthostatic VS BP Location FiO2 (%)   Temporal Monitor Sitting Left arm --      Pain Score       7           Vitals:    04/06/21 0824 04/06/21 1112 04/06/21 1130   BP: 135/74 114/60 113/81   Pulse: 94 90 85   Patient Position - Orthostatic VS: Sitting Sitting Sitting         Visual Acuity      ED Medications  Medications   ceFAZolin (ANCEF) IVPB (premix in dextrose) 2,000 mg 50 mL (has no administration in time range)   metroNIDAZOLE (FLAGYL) IVPB (premix) 500 mg 100 mL (has no administration in time range)   sodium chloride 0 9 % bolus 1,000 mL (0 mL Intravenous Stopped 4/6/21 0956)   ketorolac (TORADOL) injection 15 mg (15 mg Intravenous Given 4/6/21 0917)   ondansetron (ZOFRAN) injection 4 mg (4 mg Intravenous Given 4/6/21 0917)   sodium chloride 0 9 % bolus 1,000 mL (1,000 mL Intravenous New Bag 4/6/21 0956)   iohexol (OMNIPAQUE) 350 MG/ML injection (SINGLE-DOSE) 100 mL (100 mL Intravenous Given 4/6/21 1016)       Diagnostic Studies  Results Reviewed     Procedure Component Value Units Date/Time    COVID19, Influenza A/B, RSV PCR, [facility] [[id number]]     Lab Status: No result Specimen: Nares from Nose     Comprehensive metabolic panel [[id number]] Collected: 04/06/21 0916    Lab Status: Final result Specimen: Blood from Arm, Left Updated: 04/06/21 0941     Sodium 139 mmol/L      Potassium 4 2 mmol/L      Chloride 105 mmol/L      CO2 26 mmol/L      ANION GAP 8 mmol/L      BUN 15 mg/dL      Creatinine 0 83 mg/dL      Glucose 100 mg/dL      Calcium 8 8 mg/dL      AST 27 U/L      ALT 43 U/L      Alkaline Phosphatase 82 U/L      Total Protein 8 0 g/dL      Albumin 4 0 g/dL      Total Bilirubin 0 40 mg/dL      eGFR 89 ml/min/1 73sq m     Narrative:      [place] guidelines for Chronic Kidney Disease (CKD):     Stage 1 with normal or high GFR (GFR > 90 mL/min/1 73 square meters)    Stage 2 Mild CKD (GFR = 60-89 mL/min/1 73 square meters)    Stage 3A Moderate CKD (GFR = 45-59 mL/min/1 73 square meters)    Stage 3B Moderate CKD (GFR = 30-44 mL/min/1 73 square meters)    Stage 4 Severe CKD (GFR = 15-29 mL/min/1 73 square meters)    Stage 5 End Stage CKD (GFR <15 mL/min/1 73 square meters)  Note: GFR calculation is accurate only with a steady state creatinine    Lipase [[id number]]  (Normal) Collected: 04/06/21 0916    Lab Status: Final result Specimen: Blood from Arm, Left Updated: 04/06/21 0934     Lipase 89 u/L     UA w Reflex to Microscopic w Reflex to Culture [[id number]] Collected: 04/06/21 0912    Lab Status: Final result Specimen: Urine, Clean Catch Updated: 04/06/21 0926     Color, UA Yellow     Clarity, UA Slightly Cloudy     Specific Gravity, UA 1 015     pH, UA 5 5     Leukocytes, UA Negative     Nitrite, UA Negative     Protein, UA Negative mg/dl      Glucose, UA Negative mg/dl      Ketones, UA Negative mg/dl      Urobilinogen, UA 0 2 E U /dl      Bilirubin, UA Negative     Blood, UA Negative    CBC and differential [[id number]]  (Abnormal) Collected: 04/06/21 0916    Lab Status: Final result Specimen: Blood from Arm, Left Updated: 04/06/21 0923     WBC 18 36 Thousand/uL      RBC 4 93 Million/uL      Hemoglobin 14 0 g/dL      Hematocrit 43 3 %      MCV 88 fL      MCH 28 4 pg      MCHC 32 3 g/dL      RDW 14 3 %      MPV 10 0 fL      Platelets 442 Thousands/uL      nRBC 0 /100 WBCs      Neutrophils Relative 79 %      Immat GRANS % 1 %      Lymphocytes Relative 13 %      Monocytes Relative 6 %      Eosinophils Relative 1 %      Basophils Relative 0 %      Neutrophils Absolute 14 62 Thousands/µL      Immature Grans Absolute 0 10 Thousand/uL      Lymphocytes Absolute 2 37 Thousands/µL      Monocytes Absolute 1 07 Thousand/µL      Eosinophils Absolute 0 16 Thousand/µL      Basophils Absolute 0 04 Thousands/µL     POCT pregnancy, urine [[id number]]  (Normal) Resulted: 04/06/21 0912    Lab Status: Final result Updated: 04/06/21 0912     EXT PREG TEST UR (Ref: Negative) negative     Control valid                 CT abdomen pelvis with contrast   Final Result by Celine Lu MD (04/06 1036)      Findings in the gallbladder suggestive of cholecystitis  Consider ultrasound for further evaluation  Workstation performed: [id number]         US gallbladder    (Results Pending)              Procedures  Procedures         ED Course        discussed with General surgery, recommends admission to Internal Medicine, will consult  SBIRT 20yo+      Most Recent Value   SBIRT (24 yo +)   In order to provide better care to our patients, we are screening all of our patients for alcohol and drug use  Would it be okay to ask you these screening questions? Yes Filed at: 04/06/2021 0920   Initial Alcohol Screen: US AUDIT-C    1  How often do you have a drink containing alcohol?   1 Filed at: 04/06/2021 0920   2  How many drinks containing alcohol do you have on a typical day you are drinking? 1 Filed at: 04/06/2021 0920   3b  FEMALE Any Age, or MALE 65+: How often do you have 4 or more drinks on one occassion? 1 Filed at: 04/06/2021 0920   Audit-C Score  3 Filed at: 04/06/2021 3194   DONNA: How many times in the past year have you    Used an illegal drug or used a prescription medication for non-medical reasons? Never Filed at: 04/06/2021 0920                    MDM    Disposition  Final diagnoses:   Cholecystitis   Nausea and vomiting   Abdominal pain   Leukocytosis     Time reflects when diagnosis was documented in both MDM as applicable and the Disposition within this note     Time User Action Codes Description Comment    4/6/2021 11:38 AM Juice Samano Add [K81 9] Cholecystitis     4/6/2021 11:53 AM Verlinda Ismael T Modify [K81 9] Cholecystitis     4/6/2021 11:53 AM Binstead, Maryetta Meigs T Add [R11 2] Nausea and vomiting     4/6/2021 11:53 AM Verlinda Ismael T Add [R10 9] Abdominal pain     4/6/2021 11:54 AM Verlinda Ismael T Add [D72 829] Leukocytosis       ED Disposition     ED Disposition Condition Date/Time Comment    Admit Stable Tue Apr 6, 2021 11:52 AM Case was discussed with ABIODUN and the patient's admission status was agreed to be Admission Status: observation status to the service of Dr Amanda Crocker  Follow-up Information    None         Patient's Medications   Discharge Prescriptions    No medications on file     No discharge procedures on file      PDMP Review     None          ED Provider  Electronically Signed by           Vianey Broussard DO  04/06/21 0744

## 2021-04-06 NOTE — CONSULTS
Consultation - General Surgery   Sanchez Jolly 45 y o  female MRN: [id number]  Unit/Bed#: 420-01 Encounter: [id number]    Assessment/Plan     Assessment:  Epigastric and RUQ pain w nausea/vomiting  History of biliary dyskinesia  Leukocytosis 18 36  Lipase 89  LFT/s nml  COVID positive 21      Plan:  Anticipate OR tomorrow for lap versus open cholecystectomy  Clear liquids as tolerated remainder of today  NPO after midnight (ordered)  Ancef 2g IV on call to OR (ordered)  IV protonix  IVF hydration  Monitor I/Os  Medicate PRN pain/nausea  Follow qAM CBC and BMP  Heparin anticoagulation  SCDs  OOB ad rafia      History of Present Illness     HPI:  Sanchez Jolly is a 45 y o  female with PMH per above who presented to the ED earlier today with complaints of epigastric / right upper quadrant pain with nausea and vomiting that began last evening  Vomiting has resolved at this time  The pain is non radiating, intermittent in nature and 8/10 when present  She has a known history of biliary dyskinesia and was planning on undergoing elective cholecystectomy last year  This was put off due to [place] pandemic  She states that she has experienced several "gall attacks" over the past year  She denies fevers/chills  Denies bowel or bladder issues  She does not take any blood thinners  She last ate at 11pm last evening  Past abdominopelvic surgery significant for   Inpatient consult to Acute Care Surgery  Consult performed by: Jasson Cuellar PA-C  Consult ordered by: Janki Zhang MD          Review of Systems   Constitutional: Negative  HENT: Negative  Eyes: Negative  Respiratory: Negative  Cardiovascular: Negative  Gastrointestinal: Positive for abdominal pain, diarrhea, nausea and vomiting  Negative for abdominal distention and blood in stool  Endocrine: Negative  Genitourinary: Negative  Musculoskeletal: Negative  Skin: Negative  Allergic/Immunologic: Negative  Neurological: Negative  Hematological: Negative  Psychiatric/Behavioral: Negative  Historical Information   Past Medical History:   Diagnosis Date    PVC (premature ventricular contraction)      Past Surgical History:   Procedure Laterality Date    ANTERIOR CRUCIATE LIGAMENT REPAIR Left      SECTION      WISDOM TOOTH EXTRACTION       Social History   Social History     Substance and Sexual Activity   Alcohol Use Yes    Comment: rare     Social History     Substance and Sexual Activity   Drug Use No     E-Cigarette/Vaping    E-Cigarette Use Never User      E-Cigarette/Vaping Substances     Social History     Tobacco Use   Smoking Status Never Smoker   Smokeless Tobacco Never Used     Family History: non-contributory    Meds/Allergies   all current active meds have been reviewed, current meds:   No current facility-administered medications for this encounter  and PTA meds:   Prior to Admission Medications   Prescriptions Last Dose Informant Patient Reported? Taking?    Cholecalciferol (VITAMIN D PO)  Self Yes Yes   Sig: Take 5,000 Units by mouth daily    b complex vitamins tablet   Yes Yes   Sig: Take 1 tablet by mouth daily   lansoprazole (PREVACID) 15 mg capsule  Self Yes Yes   Sig: Take 15 mg by mouth daily      Facility-Administered Medications: None     No Known Allergies    Objective   First Vitals:   Blood Pressure: 135/74 (21 08)  Pulse: 94 (21)  Temperature: 97 7 °F (36 5 °C) (21)  Temp Source: Temporal (21)  Respirations: 18 (21)  Height: 5' 1" (154 9 cm) (21)  Weight - Scale: 90 7 kg (199 lb 15 3 oz) (21)  SpO2: 98 % (21)    Current Vitals:   Blood Pressure: 117/66 (21 1230)  Pulse: 79 (21 1230)  Temperature: 97 7 °F (36 5 °C) (21)  Temp Source: Temporal (21)  Respirations: 18 (21 1230)  Height: 5' 1" (154 9 cm) (21)  Weight - Scale: 90 7 kg (199 lb 15 3 oz) (04/06/21 0824)  SpO2: 96 % (04/06/21 1230)      Intake/Output Summary (Last 24 hours) at 4/6/2021 1257  Last data filed at 4/6/2021 1237  Gross per 24 hour   Intake 1050 ml   Output --   Net 1050 ml       Invasive Devices     Peripheral Intravenous Line            Peripheral IV 04/06/21 Left Antecubital less than 1 day                Physical Exam  Vitals signs and nursing note reviewed  Constitutional:       General: She is not in acute distress  Appearance: Normal appearance  HENT:      Head: Normocephalic and atraumatic  Right Ear: External ear normal       Left Ear: External ear normal       Nose: Nose normal       Mouth/Throat:      Mouth: Mucous membranes are dry  Pharynx: Oropharynx is clear  Eyes:      Conjunctiva/sclera: Conjunctivae normal       Pupils: Pupils are equal, round, and reactive to light  Neck:      Musculoskeletal: Normal range of motion and neck supple  Cardiovascular:      Rate and Rhythm: Normal rate and regular rhythm  Pulses: Normal pulses  Heart sounds: Normal heart sounds  No murmur  Pulmonary:      Effort: Pulmonary effort is normal  No respiratory distress  Breath sounds: Normal breath sounds  Abdominal:      General: Abdomen is flat  Bowel sounds are normal       Palpations: Abdomen is soft  There is no mass  Tenderness: There is no guarding or rebound  Hernia: No hernia is present  Comments: Tender to palpation in epigastric region and to lesser extent RUQ  Musculoskeletal: Normal range of motion  General: No deformity  Skin:     General: Skin is warm and dry  Capillary Refill: Capillary refill takes less than 2 seconds  Coloration: Skin is not jaundiced  Neurological:      General: No focal deficit present  Mental Status: She is alert and oriented to person, place, and time  Cranial Nerves: No cranial nerve deficit     Psychiatric:         Mood and Affect: Mood normal  Behavior: Behavior normal          Thought Content: Thought content normal          Judgment: Judgment normal          Lab Results:   I have personally reviewed pertinent lab results  CBC:   Lab Results   Component Value Date    WBC 18 36 (H) 04/06/2021    HGB 14 0 04/06/2021    HCT 43 3 04/06/2021    MCV 88 04/06/2021     (H) 04/06/2021    MCH 28 4 04/06/2021    MCHC 32 3 04/06/2021    RDW 14 3 04/06/2021    MPV 10 0 04/06/2021    NRBC 0 04/06/2021     CMP:   Lab Results   Component Value Date    SODIUM 139 04/06/2021    K 4 2 04/06/2021     04/06/2021    CO2 26 04/06/2021    BUN 15 04/06/2021    CREATININE 0 83 04/06/2021    CALCIUM 8 8 04/06/2021    AST 27 04/06/2021    ALT 43 04/06/2021    ALKPHOS 82 04/06/2021    EGFR 89 04/06/2021     Imaging: I have personally reviewed pertinent reports  US gallbladder 4/6/21  Impression:     No sonographic evidence for cholelithiasis or acute cholecystitis  CT abd/pelvis w contrast 4/6/21  LIVER/BILIARY TREE:  The gallbladder wall thickening with minimal pericholecystic edema  No radiopaque calculi appreciated  GALLBLADDER:  No calcified gallstones  No pericholecystic inflammatory change  Impression:     Findings in the gallbladder suggestive of cholecystitis   Consider ultrasound for further evaluation  HIDA w [facility]CK 3/17/20  IMPRESSION:   1  Abnormal contractile response of the gallbladder to cholecystokinin infusion  Findings suspicious for biliary dyskinesia  (Gallbladder EF is 11%)        EKG, Pathology, and Other Studies: I have personally reviewed pertinent reports  Counseling / Coordination of Care  Total floor / unit time spent today 40 minutes  Greater than 50% of total time was spent with the patient and / or family counseling and / or coordination of care  A description of the counseling / coordination of care: review of labs/imaging, obtaining history, performing exam, discussion of treatment plan        Nacho Nichols Margot Wang PA-C

## 2021-04-06 NOTE — PLAN OF CARE
Problem: PAIN - ADULT  Goal: Verbalizes/displays adequate comfort level or baseline comfort level  Description: Interventions:  - Encourage patient to monitor pain and request assistance  - Assess pain using appropriate pain scale (0-10 pain scale)  - Administer analgesics based on type and severity of pain and evaluate response  - Implement non-pharmacological measures as appropriate and evaluate response  - Consider cultural and social influences on pain and pain management  - Notify physician/advanced practitioner if interventions unsuccessful or patient reports new pain  Outcome: Progressing     Problem: INFECTION - ADULT  Goal: Absence or prevention of progression during hospitalization  Description: INTERVENTIONS:  - Assess and monitor for signs and symptoms of infection  - Monitor lab/diagnostic results  - Monitor all insertion sites, i e  indwelling lines  - Administer medications as ordered  - Instruct and encourage patient and family to use good hand hygiene technique  Outcome: Progressing     Problem: SAFETY ADULT  Goal: Patient will remain free of falls  Description: INTERVENTIONS:  - Assess patient frequently for physical needs  -  Identify cognitive and physical deficits and behaviors that affect risk of falls  (IV line, surgery, post operative pain)  -  Bates fall precautions as indicated by assessment   (low fall risk)  - Educate patient/family on patient safety including physical limitations  - Instruct patient to call for assistance with activity based on assessment  - Modify environment to reduce risk of injury  Outcome: Progressing  Goal: Maintain or return to baseline ADL function  Description: INTERVENTIONS:  -  Assess patient's ability to carry out ADLs; (independent)   - Assess range of motion  - Assess patient's mobility; (independent)  - Encourage maximum independence but intervene and supervise when necessary  - Assess for home care needs following discharge   - Provide patient education as appropriate  Outcome: Progressing  Goal: Maintain or return mobility status to optimal level  Description: INTERVENTIONS:  - Assess patient's baseline mobility status (independent)    - Identify cognitive and physical deficits and behaviors that affect mobility(IV line, surgery, post operative pain)  - Reeves fall precautions as indicated by assessment (low fall risk)  - Record patient progress and toleration of activity level on Mobility SBAR; progress patient to next Phase/Stage  - Instruct patient to call for assistance with activity based on assessment  Outcome: Progressing     Problem: DISCHARGE PLANNING  Goal: Discharge to home or other facility with appropriate resources  Description: INTERVENTIONS:  - Identify barriers to discharge w/patient and caregiver  - Arrange for needed discharge resources and transportation as appropriate  - Identify discharge learning needs (meds, wound care, etc )  - Refer to Case Management Department for coordinating discharge planning if the patient needs post-hospital services based on physician/advanced practitioner order or complex needs related to functional status, cognitive ability, or social support system  Outcome: Progressing     Problem: Knowledge Deficit  Goal: Patient/family/caregiver demonstrates understanding of disease process, treatment plan, medications, and discharge instructions  Description: Complete learning assessment and assess knowledge base    Interventions:  - Provide teaching at level of understanding  - Provide teaching via preferred learning methods  Outcome: Progressing     Problem: GASTROINTESTINAL - ADULT  Goal: Maintains adequate nutritional intake  Description: INTERVENTIONS:  - Monitor percentage of each meal consumed  - Identify factors contributing to decreased intake, treat as appropriate  - Assist with meals as needed  - Monitor I&O, weight, and lab values if indicated  - Obtain nutrition services referral as needed  Outcome: Progressing

## 2021-04-07 ENCOUNTER — ANESTHESIA (OUTPATIENT)
Dept: PERIOP | Facility: HOSPITAL | Age: 41
End: 2021-04-07
Payer: COMMERCIAL

## 2021-04-07 ENCOUNTER — APPOINTMENT (OUTPATIENT)
Dept: RADIOLOGY | Facility: HOSPITAL | Age: 41
End: 2021-04-07
Payer: COMMERCIAL

## 2021-04-07 LAB
ALBUMIN SERPL BCP-MCNC: 3 G/DL (ref 3.5–5)
ALP SERPL-CCNC: 58 U/L (ref 46–116)
ALT SERPL W P-5'-P-CCNC: 25 U/L (ref 12–78)
ANION GAP SERPL CALCULATED.3IONS-SCNC: 5 MMOL/L (ref 4–13)
AST SERPL W P-5'-P-CCNC: 13 U/L (ref 5–45)
BASOPHILS # BLD AUTO: 0.02 THOUSANDS/ΜL (ref 0–0.1)
BASOPHILS NFR BLD AUTO: 0 % (ref 0–1)
BILIRUB SERPL-MCNC: 0.28 MG/DL (ref 0.2–1)
BUN SERPL-MCNC: 7 MG/DL (ref 5–25)
CALCIUM ALBUM COR SERPL-MCNC: 8.9 MG/DL (ref 8.3–10.1)
CALCIUM SERPL-MCNC: 8.1 MG/DL (ref 8.3–10.1)
CHLORIDE SERPL-SCNC: 109 MMOL/L (ref 100–108)
CO2 SERPL-SCNC: 26 MMOL/L (ref 21–32)
CREAT SERPL-MCNC: 0.79 MG/DL (ref 0.6–1.3)
EOSINOPHIL # BLD AUTO: 0.28 THOUSAND/ΜL (ref 0–0.61)
EOSINOPHIL NFR BLD AUTO: 4 % (ref 0–6)
ERYTHROCYTE [DISTWIDTH] IN BLOOD BY AUTOMATED COUNT: 14.6 % (ref 11.6–15.1)
GFR SERPL CREATININE-BSD FRML MDRD: 95 ML/MIN/1.73SQ M
GLUCOSE P FAST SERPL-MCNC: 95 MG/DL (ref 65–99)
GLUCOSE SERPL-MCNC: 95 MG/DL (ref 65–140)
HCT VFR BLD AUTO: 34 % (ref 34.8–46.1)
HGB BLD-MCNC: 11 G/DL (ref 11.5–15.4)
IMM GRANULOCYTES # BLD AUTO: 0.03 THOUSAND/UL (ref 0–0.2)
IMM GRANULOCYTES NFR BLD AUTO: 0 % (ref 0–2)
LYMPHOCYTES # BLD AUTO: 1.85 THOUSANDS/ΜL (ref 0.6–4.47)
LYMPHOCYTES NFR BLD AUTO: 24 % (ref 14–44)
MCH RBC QN AUTO: 28.8 PG (ref 26.8–34.3)
MCHC RBC AUTO-ENTMCNC: 32.4 G/DL (ref 31.4–37.4)
MCV RBC AUTO: 89 FL (ref 82–98)
MONOCYTES # BLD AUTO: 0.53 THOUSAND/ΜL (ref 0.17–1.22)
MONOCYTES NFR BLD AUTO: 7 % (ref 4–12)
NEUTROPHILS # BLD AUTO: 5.16 THOUSANDS/ΜL (ref 1.85–7.62)
NEUTS SEG NFR BLD AUTO: 65 % (ref 43–75)
NRBC BLD AUTO-RTO: 0 /100 WBCS
PLATELET # BLD AUTO: 283 THOUSANDS/UL (ref 149–390)
PMV BLD AUTO: 10.2 FL (ref 8.9–12.7)
POTASSIUM SERPL-SCNC: 3.8 MMOL/L (ref 3.5–5.3)
PROT SERPL-MCNC: 6.1 G/DL (ref 6.4–8.2)
RBC # BLD AUTO: 3.82 MILLION/UL (ref 3.81–5.12)
SODIUM SERPL-SCNC: 140 MMOL/L (ref 136–145)
WBC # BLD AUTO: 7.87 THOUSAND/UL (ref 4.31–10.16)

## 2021-04-07 PROCEDURE — 47563 LAPARO CHOLECYSTECTOMY/GRAPH: CPT | Performed by: SURGERY

## 2021-04-07 PROCEDURE — 88304 TISSUE EXAM BY PATHOLOGIST: CPT | Performed by: PATHOLOGY

## 2021-04-07 PROCEDURE — 47531 INJECTION FOR CHOLANGIOGRAM: CPT

## 2021-04-07 PROCEDURE — 99024 POSTOP FOLLOW-UP VISIT: CPT | Performed by: SURGERY

## 2021-04-07 PROCEDURE — 85025 COMPLETE CBC W/AUTO DIFF WBC: CPT | Performed by: SURGERY

## 2021-04-07 PROCEDURE — 80053 COMPREHEN METABOLIC PANEL: CPT | Performed by: SURGERY

## 2021-04-07 PROCEDURE — 99225 PR SBSQ OBSERVATION CARE/DAY 25 MINUTES: CPT | Performed by: PHYSICIAN ASSISTANT

## 2021-04-07 PROCEDURE — 47563 LAPARO CHOLECYSTECTOMY/GRAPH: CPT | Performed by: PHYSICIAN ASSISTANT

## 2021-04-07 RX ORDER — KETOROLAC TROMETHAMINE 30 MG/ML
INJECTION, SOLUTION INTRAMUSCULAR; INTRAVENOUS AS NEEDED
Status: DISCONTINUED | OUTPATIENT
Start: 2021-04-07 | End: 2021-04-07

## 2021-04-07 RX ORDER — MEPERIDINE HYDROCHLORIDE 25 MG/ML
12.5 INJECTION INTRAMUSCULAR; INTRAVENOUS; SUBCUTANEOUS ONCE AS NEEDED
Status: DISCONTINUED | OUTPATIENT
Start: 2021-04-07 | End: 2021-04-07

## 2021-04-07 RX ORDER — ROCURONIUM BROMIDE 10 MG/ML
INJECTION, SOLUTION INTRAVENOUS AS NEEDED
Status: DISCONTINUED | OUTPATIENT
Start: 2021-04-07 | End: 2021-04-07

## 2021-04-07 RX ORDER — HYDROMORPHONE HCL/PF 1 MG/ML
SYRINGE (ML) INJECTION AS NEEDED
Status: DISCONTINUED | OUTPATIENT
Start: 2021-04-07 | End: 2021-04-07

## 2021-04-07 RX ORDER — DEXAMETHASONE SODIUM PHOSPHATE 4 MG/ML
INJECTION, SOLUTION INTRA-ARTICULAR; INTRALESIONAL; INTRAMUSCULAR; INTRAVENOUS; SOFT TISSUE AS NEEDED
Status: DISCONTINUED | OUTPATIENT
Start: 2021-04-07 | End: 2021-04-07

## 2021-04-07 RX ORDER — PROPOFOL 10 MG/ML
INJECTION, EMULSION INTRAVENOUS AS NEEDED
Status: DISCONTINUED | OUTPATIENT
Start: 2021-04-07 | End: 2021-04-07

## 2021-04-07 RX ORDER — ONDANSETRON 2 MG/ML
4 INJECTION INTRAMUSCULAR; INTRAVENOUS ONCE AS NEEDED
Status: DISCONTINUED | OUTPATIENT
Start: 2021-04-07 | End: 2021-04-07

## 2021-04-07 RX ORDER — MAGNESIUM HYDROXIDE 1200 MG/15ML
LIQUID ORAL AS NEEDED
Status: DISCONTINUED | OUTPATIENT
Start: 2021-04-07 | End: 2021-04-07 | Stop reason: HOSPADM

## 2021-04-07 RX ORDER — MIDAZOLAM HYDROCHLORIDE 2 MG/2ML
INJECTION, SOLUTION INTRAMUSCULAR; INTRAVENOUS AS NEEDED
Status: DISCONTINUED | OUTPATIENT
Start: 2021-04-07 | End: 2021-04-07

## 2021-04-07 RX ORDER — FENTANYL CITRATE 50 UG/ML
INJECTION, SOLUTION INTRAMUSCULAR; INTRAVENOUS AS NEEDED
Status: DISCONTINUED | OUTPATIENT
Start: 2021-04-07 | End: 2021-04-07

## 2021-04-07 RX ORDER — ALBUTEROL SULFATE 2.5 MG/3ML
2.5 SOLUTION RESPIRATORY (INHALATION) ONCE AS NEEDED
Status: DISCONTINUED | OUTPATIENT
Start: 2021-04-07 | End: 2021-04-07

## 2021-04-07 RX ORDER — LIDOCAINE HYDROCHLORIDE 10 MG/ML
INJECTION, SOLUTION EPIDURAL; INFILTRATION; INTRACAUDAL; PERINEURAL AS NEEDED
Status: DISCONTINUED | OUTPATIENT
Start: 2021-04-07 | End: 2021-04-07

## 2021-04-07 RX ORDER — HYDROMORPHONE HCL/PF 1 MG/ML
0.5 SYRINGE (ML) INJECTION
Status: DISCONTINUED | OUTPATIENT
Start: 2021-04-07 | End: 2021-04-07

## 2021-04-07 RX ORDER — FENTANYL CITRATE/PF 50 MCG/ML
50 SYRINGE (ML) INJECTION
Status: DISCONTINUED | OUTPATIENT
Start: 2021-04-07 | End: 2021-04-07

## 2021-04-07 RX ORDER — BUPIVACAINE HYDROCHLORIDE 5 MG/ML
INJECTION, SOLUTION PERINEURAL AS NEEDED
Status: DISCONTINUED | OUTPATIENT
Start: 2021-04-07 | End: 2021-04-07 | Stop reason: HOSPADM

## 2021-04-07 RX ORDER — ONDANSETRON 2 MG/ML
INJECTION INTRAMUSCULAR; INTRAVENOUS AS NEEDED
Status: DISCONTINUED | OUTPATIENT
Start: 2021-04-07 | End: 2021-04-07

## 2021-04-07 RX ADMIN — FENTANYL CITRATE 100 MCG: 50 INJECTION, SOLUTION INTRAMUSCULAR; INTRAVENOUS at 15:47

## 2021-04-07 RX ADMIN — HEPARIN SODIUM 5000 UNITS: 5000 INJECTION INTRAVENOUS; SUBCUTANEOUS at 13:41

## 2021-04-07 RX ADMIN — HYDROMORPHONE HYDROCHLORIDE 0.5 MG: 1 INJECTION, SOLUTION INTRAMUSCULAR; INTRAVENOUS; SUBCUTANEOUS at 16:13

## 2021-04-07 RX ADMIN — SODIUM CHLORIDE 100 ML/HR: 0.9 INJECTION, SOLUTION INTRAVENOUS at 13:42

## 2021-04-07 RX ADMIN — CIPROFLOXACIN 400 MG: 2 INJECTION, SOLUTION INTRAVENOUS at 09:23

## 2021-04-07 RX ADMIN — LIDOCAINE HYDROCHLORIDE 50 MG: 10 INJECTION, SOLUTION EPIDURAL; INFILTRATION; INTRACAUDAL; PERINEURAL at 15:47

## 2021-04-07 RX ADMIN — HEPARIN SODIUM 5000 UNITS: 5000 INJECTION INTRAVENOUS; SUBCUTANEOUS at 05:39

## 2021-04-07 RX ADMIN — SODIUM CHLORIDE: 0.9 INJECTION, SOLUTION INTRAVENOUS at 17:32

## 2021-04-07 RX ADMIN — ONDANSETRON HYDROCHLORIDE 4 MG: 2 INJECTION, SOLUTION INTRAVENOUS at 15:50

## 2021-04-07 RX ADMIN — ROCURONIUM BROMIDE 50 MG: 50 INJECTION, SOLUTION INTRAVENOUS at 15:47

## 2021-04-07 RX ADMIN — PROPOFOL 200 MG: 10 INJECTION, EMULSION INTRAVENOUS at 15:47

## 2021-04-07 RX ADMIN — KETOROLAC TROMETHAMINE 30 MG: 30 INJECTION, SOLUTION INTRAMUSCULAR; INTRAVENOUS at 17:56

## 2021-04-07 RX ADMIN — SUGAMMADEX 200 MG: 100 INJECTION, SOLUTION INTRAVENOUS at 18:04

## 2021-04-07 RX ADMIN — HEPARIN SODIUM 5000 UNITS: 5000 INJECTION INTRAVENOUS; SUBCUTANEOUS at 21:33

## 2021-04-07 RX ADMIN — METRONIDAZOLE 500 MG: 500 INJECTION, SOLUTION INTRAVENOUS at 05:41

## 2021-04-07 RX ADMIN — DEXAMETHASONE SODIUM PHOSPHATE 4 MG: 4 INJECTION, SOLUTION INTRAMUSCULAR; INTRAVENOUS at 15:50

## 2021-04-07 RX ADMIN — CEFAZOLIN SODIUM 2000 MG: 2 SOLUTION INTRAVENOUS at 15:38

## 2021-04-07 RX ADMIN — MORPHINE SULFATE 2 MG: 2 INJECTION, SOLUTION INTRAMUSCULAR; INTRAVENOUS at 19:03

## 2021-04-07 RX ADMIN — MORPHINE SULFATE 2 MG: 2 INJECTION, SOLUTION INTRAMUSCULAR; INTRAVENOUS at 23:52

## 2021-04-07 RX ADMIN — MIDAZOLAM HYDROCHLORIDE 2 MG: 1 INJECTION, SOLUTION INTRAMUSCULAR; INTRAVENOUS at 15:38

## 2021-04-07 RX ADMIN — METRONIDAZOLE 500 MG: 500 INJECTION, SOLUTION INTRAVENOUS at 13:44

## 2021-04-07 NOTE — PROGRESS NOTES
[address] [facility] [address]  Progress Note - Rasheed Bile 1980, 45 y o  female MRN: [id number]  Unit/Bed#: OR [place] Encounter: [id number]  Primary Care Provider: Olvin De La Torre DO   Date and time admitted to hospital: 2021  8:19 AM    * Biliary colic  Assessment & Plan  Patient with epigastric/RUQ pain and nausea/vomiting  RUQ Ultrasound without acute pathology  Previous HIDA revealed biliary dyskinesia  Received Ancef and Flagyl, will hold off on additional antibiotics  Supportive care in the form of IVF, antiemetics, pain control   NPO  General surgery consultation appreciated- plan for laparoscopic cholecystectomy today   VTE Pharmacologic Prophylaxis:   Pharmacologic: Heparin  Mechanical VTE Prophylaxis in Place: Yes    Patient Centered Rounds: I have performed bedside rounds with nursing staff today  Discussions with Specialists or Other Care Team Provider: nursing, CM    Education and Discussions with Family / Patient: patient    Time Spent for Care: 20 minutes  More than 50% of total time spent on counseling and coordination of care as described above  Current Length of Stay: 0 day(s)    Current Patient Status: Observation   Certification Statement: The patient will continue to require additional inpatient hospital stay due to planned laparoscopy cholecystectomy today     Discharge Plan: TBD    Code Status: Level 1 - Full Code      Subjective: The patient was seen and examined  The patient currently denies any abdominal pain  Objective:     Vitals:   Temp (24hrs), Av 7 °F (37 1 °C), Min:98 4 °F (36 9 °C), Max:99 1 °F (37 3 °C)    Temp:  [98 4 °F (36 9 °C)-99 1 °F (37 3 °C)] 99 1 °F (37 3 °C)  HR:  [67-84] 84  Resp:  [16-17] 16  BP: ()/(47-77) 117/75  SpO2:  [93 %-96 %] 95 %  Body mass index is 36 9 kg/m²  Input and Output Summary (last 24 hours):        Intake/Output Summary (Last 24 hours) at 2021 1712  Last data filed at 2021 1341  Gross per 24 hour   Intake 1950 ml   Output --   Net 1950 ml       Physical Exam:     Physical Exam  Vitals signs and nursing note reviewed  Constitutional:       General: She is awake  Appearance: Normal appearance  Cardiovascular:      Rate and Rhythm: Normal rate and regular rhythm  Pulmonary:      Effort: Pulmonary effort is normal       Breath sounds: Normal breath sounds  No wheezing, rhonchi or rales  Abdominal:      General: Bowel sounds are normal       Palpations: Abdomen is soft  Tenderness: There is no abdominal tenderness  Skin:     General: Skin is warm and dry  Neurological:      General: No focal deficit present  Mental Status: She is alert and oriented to person, place, and time  Psychiatric:         Attention and Perception: Attention normal          Mood and Affect: Mood normal          Speech: Speech normal          Behavior: Behavior is cooperative  Additional Data:     Labs:    Results from last 7 days   Lab Units 04/07/21  0922   WBC Thousand/uL 7 87   HEMOGLOBIN g/dL 11 0*   HEMATOCRIT % 34 0*   PLATELETS Thousands/uL 283   NEUTROS PCT % 65   LYMPHS PCT % 24   MONOS PCT % 7   EOS PCT % 4     Results from last 7 days   Lab Units 04/07/21  0922   SODIUM mmol/L 140   POTASSIUM mmol/L 3 8   CHLORIDE mmol/L 109*   CO2 mmol/L 26   BUN mg/dL 7   CREATININE mg/dL 0 79   ANION GAP mmol/L 5   CALCIUM mg/dL 8 1*   ALBUMIN g/dL 3 0*   TOTAL BILIRUBIN mg/dL 0 28   ALK PHOS U/L 58   ALT U/L 25   AST U/L 13   GLUCOSE RANDOM mg/dL 95                           * I Have Reviewed All Lab Data Listed Above  * Additional Pertinent Lab Tests Reviewed:  All Labs Within Last 24 Hours Reviewed    Imaging:    Imaging Reports Reviewed Today Include: none  Imaging Personally Reviewed by Myself Includes:  none    Recent Cultures (last 7 days):           Last 24 Hours Medication List:   Current Facility-Administered Medications   Medication Dose Route Frequency Provider Last Rate  [MAR Hold] ciprofloxacin  400 mg Intravenous Q12H Magdy Benedict,  mg (04/07/21 0923)    [MAR Hold] heparin (porcine)  5,000 Units Subcutaneous Q8H [address] Domenic Martinez MD      [facility] Hold] metroNIDAZOLE  500 mg Intravenous Q8H Magdy Benedict,  mg (04/07/21 1344)    [MAR Hold] morphine injection  4 mg Intravenous Q4H PRN Cali Barnhart MD      [facility] Hold] morphine injection  2 mg Intravenous Q4H PRN Domenic Martinez MD      [facility] Hold] ondansetron  4 mg Intravenous Q6H PRN Sofy Prieto PA-C      [MAR Hold] pantoprazole  20 mg Oral Early Morning Cali Barnhart MD      sodium chloride  100 mL/hr Intravenous Continuous Cali Barnhart  mL/hr (04/07/21 1452)    sodium chloride    PRN Kandice Brunner, DO       Facility-Administered Medications Ordered in Other Encounters   Medication Dose Route Frequency Provider Last Rate    dexamethasone   Intravenous PRN Mariana Mend, CRNA      fentanyl citrate (PF)   Intravenous PRN Mariana Mend, CRNA      HYDROmorphone    PRN Mariana Mend, CRNA      lidocaine (PF)    PRN Mariana Mend, CRNA      midazolam   Intravenous PRN Mariana Mend, CRNA      ondansetron   Intravenous PRN Mariana Mend, CRNA      propofol   Intravenous PRN Mariana Mend, CRNA      ROCuronium   Intravenous PRN Mariana Mend, CRNA          Today, Patient Was Seen By: Mp Almonte PA-C    ** Please Note: Dictation voice to text software may have been used in the creation of this document   **

## 2021-04-07 NOTE — UTILIZATION REVIEW
Initial Clinical Review    Admission: Date/Time/Statement:   Admission Orders (From admission, onward)     Ordered        04/06/21 1153  Place in Observation  Once         04/06/21 1153  Place in Observation  Once                   Orders Placed This Encounter   Procedures    Place in Observation     Standing Status:   Standing     Number of Occurrences:   1     Order Specific Question:   Level of Care     Answer:   Med Surg [16]    Place in Observation     Standing Status:   Standing     Number of Occurrences:   1     Order Specific Question:   Level of Care     Answer:   Med Surg [16]     ED Arrival Information     Expected Arrival Acuity Means of Arrival Escorted By Service Admission Type    - 4/6/2021 08:13 Urgent Walk-In Family Member General Medicine Urgent    Arrival Complaint    diarrhea        Chief Complaint   Patient presents with    Vomiting     patient reports "I think I'm having a gall attack"  she reports pain vomiting and diarrhea beginning this morning  Assessment/Plan:   80-year-old female with history of previous episodes of gallbladder pain, biliary colic  States she was in discussions to have her gallbladder surgically removed last year prior to the [facility] pandemic  She states she has been fairly well controlled in terms of her gallbladder "attacks"  States she began having epigastric pain and right upper quadrant pain last evening followed by vomiting and pain this morning  Vomiting has since subsided  Pain is looking the right upper quadrant  No radiation of pain  No chest pain or shortness of breath  No recent illness or injury  No change in medications  Denies any alcohol or drug abuse  No recent travel  Denies any fever chills  No change in dietary habits    Biliary colic  Assessment & Plan     NPO status  RUQ without acute pathology  Previous HIDA revealed biliary dyskinesia  Surgical consult  Received Ancef and Flagyl, will hold off on additional antibiotics  Supportive care in the form of IVF, antiemetics, pain control     Per surg:  Assessment:  Epigastric and RUQ pain w nausea/vomiting  History of biliary dyskinesia  Leukocytosis 18 36  Lipase 89  LFT/s nml  COVID positive 2/8/21        Plan:  Anticipate OR tomorrow for lap versus open cholecystectomy  Clear liquids as tolerated remainder of today  NPO after midnight (ordered)  Ancef 2g IV on call to OR (ordered)  IV protonix  IVF hydration  ED Triage Vitals [04/06/21 0824]   Temperature Pulse Respirations Blood Pressure SpO2   97 7 °F (36 5 °C) 94 18 135/74 98 %      Temp Source Heart Rate Source Patient Position - Orthostatic VS BP Location FiO2 (%)   Temporal Monitor Sitting Left arm --      Pain Score       7          Wt Readings from Last 1 Encounters:   04/06/21 91 5 kg (201 lb 11 5 oz)     Additional Vital Signs:   Date/Time  Temp  Pulse  Resp  BP  MAP (mmHg)  SpO2  O2 Device  Patient Position - Orthostatic VS   04/06/21 2346  --  --  --  98/58  --  --  --  Lying   04/06/21 23:38:02  98 4 °F (36 9 °C)  67  --  86/47Abnormal   60  96 %  --  --   04/06/21 1637  98 7 °F (37 1 °C)  81  20  120/78  92  98 %  None (Room air)  Sitting   04/06/21 12:59:31  98 6 °F (37 °C)  80  19  115/76  89  95 %  --  Sitting   04/06/21 1230  --  79  18  117/66  86  96 %  None (Room air)  --     Pertinent Labs/Diagnostic Test Results:   Results from last 7 days   Lab Units 04/06/21  1200   SARS-COV-2  Positive*     Results from last 7 days   Lab Units 04/06/21  0916   WBC Thousand/uL 18 36*   HEMOGLOBIN g/dL 14 0   HEMATOCRIT % 43 3   PLATELETS Thousands/uL 430*   NEUTROS ABS Thousands/µL 14 62*         Results from last 7 days   Lab Units 04/06/21  0916   SODIUM mmol/L 139   POTASSIUM mmol/L 4 2   CHLORIDE mmol/L 105   CO2 mmol/L 26   ANION GAP mmol/L 8   BUN mg/dL 15   CREATININE mg/dL 0 83   EGFR ml/min/1 73sq m 89   CALCIUM mg/dL 8 8     Results from last 7 days   Lab Units 04/06/21  0916   AST U/L 27   ALT U/L 43   ALK PHOS U/L 82   TOTAL PROTEIN g/dL 8 0   ALBUMIN g/dL 4 0   TOTAL BILIRUBIN mg/dL 0 40         Results from last 7 days   Lab Units 04/06/21  0916   GLUCOSE RANDOM mg/dL 100             No results found for: BETA-HYDROXYBUTYRATE                                                               Results from last 7 days   Lab Units 04/06/21  0916   LIPASE u/L 89             Results from last 7 days   Lab Units 04/06/21  0912   CLARITY UA  Slightly Cloudy   COLOR UA  Yellow   SPEC GRAV UA  1 015   PH UA  5 5   GLUCOSE UA mg/dl Negative   KETONES UA mg/dl Negative   BLOOD UA  Negative   PROTEIN UA mg/dl Negative   NITRITE UA  Negative   BILIRUBIN UA  Negative   UROBILINOGEN UA E U /dl 0 2   LEUKOCYTES UA  Negative     Results from last 7 days   Lab Units 04/06/21  1200   INFLUENZA A PCR  Negative   INFLUENZA B PCR  Negative   RSV PCR  Negative                                         4/6  Ct a/p=  Findings in the gallbladder suggestive of cholecystitis  Consider ultrasound for further evaluation  US gallbladder=  No sonographic evidence for cholelithiasis or acute cholecystitis      ED Treatment:   Medication Administration from 04/06/2021 0812 to 04/06/2021 1255       Date/Time Order Dose Route Action     04/06/2021 0916 sodium chloride 0 9 % bolus 1,000 mL 1,000 mL Intravenous New Bag     04/06/2021 0917 ketorolac (TORADOL) injection 15 mg 15 mg Intravenous Given     04/06/2021 0917 ondansetron (ZOFRAN) injection 4 mg 4 mg Intravenous Given     04/06/2021 0956 sodium chloride 0 9 % bolus 1,000 mL 1,000 mL Intravenous New Bag     04/06/2021 1016 iohexol (OMNIPAQUE) 350 MG/ML injection (SINGLE-DOSE) 100 mL 100 mL Intravenous Given     04/06/2021 1208 ceFAZolin (ANCEF) IVPB (premix in dextrose) 2,000 mg 50 mL 2,000 mg Intravenous New Bag        Past Medical History:   Diagnosis Date    PVC (premature ventricular contraction)      Present on Admission:  **None**      Admitting Diagnosis: Diarrhea [R19 7]  Cholecystitis [K81 9]  Leukocytosis [C06 568]  Abdominal pain [R10 9]  Nausea and vomiting [R11 2]  Age/Sex: 45 y o  female  Admission Orders:  Consult surgery  Scd/foot pumps    Scheduled Medications:  cefazolin, 2,000 mg, Intravenous, On Call To OR  ciprofloxacin, 400 mg, Intravenous, Q12H  heparin (porcine), 5,000 Units, Subcutaneous, Q8H ANA  metroNIDAZOLE, 500 mg, Intravenous, Q8H  pantoprazole, 20 mg, Oral, Early Morning      Continuous IV Infusions:  sodium chloride, 100 mL/hr, Intravenous, Continuous      PRN Meds:  morphine injection, 4 mg, Intravenous, Q4H PRN  morphine injection, 2 mg, Intravenous, Q4H PRN  ondansetron, 4 mg, Intravenous, Q6H PRN      Network Utilization Review Department  ATTENTION: Please call with any questions or concerns to [phone] and carefully listen to the prompts so that you are directed to the right person  All voicemails are confidential   Ty Limb all requests for admission clinical reviews, approved or denied determinations and any other requests to dedicated fax number below belonging to the campus where the patient is receiving treatment   List of dedicated fax numbers for the Facilities:  [address] DENIALS (Administrative/Medical Necessity) [phone]   [address] (Maternity/NICU/Pediatrics) [phone]   [address] Alecia Rubalcava [address] ([address]) [address] [place] [address] [address] [place] [address] [phone]

## 2021-04-07 NOTE — ANESTHESIA POSTPROCEDURE EVALUATION
Post-Op Assessment Note    CV Status:  Stable  Pain Score: 6    Pain management: adequate     Mental Status:  Alert   Hydration Status:  Stable   PONV Controlled:  None   Airway Patency:  Patent  Airway: intubated   Two or more mitigation strategies used for obstructive sleep apnea   Post Op Vitals Reviewed: Yes      Staff: Anesthesiologist         No complications documented      BP      Temp      Pulse     Resp      SpO2

## 2021-04-07 NOTE — CASE MANAGEMENT
Cm met with the patient to evaluate the patients prior function and living situation and any barriers to d/c and form a safe d/c plan  Cm also evaluated the patient for any services in the home or needs for services  Pt going to OR this afternoon to have gallbladder removed  Pt states she lives in [facility] with spouse and daughter  [address] and [address] to [address]nd floor  She does not use assistive device, independent with ADLS  Pt drives self but spouse able to  on discharge  PCP-Dr Eduardo Garsia  Pharmacy is [facility]  D/C TBD  Plans at this time is to follow up as o/p, no services needed

## 2021-04-07 NOTE — OP NOTE
OPERATIVE REPORT  PATIENT NAME: Willis Austin    :  1980  MRN: [id number]  Pt Location: [facility] OR ROOM 02    SURGERY DATE: 2021    Surgeon(s) and Role: * Kandice Brunner, DO - Primary     * Kelly Harkins PA-C - Assisting    Preop Diagnosis:  Biliary colic [K45 01]  Cholecystitis [K81 9]    Post-Op Diagnosis Codes:     * Biliary colic [O02 68]     * Cholecystitis [K81 9]    Procedure(s) (LRB):  CHOLECYSTECTOMY LAPAROSCOPIC, IOC (N/A)    Specimen(s):  ID Type Source Tests Collected by Time Destination   1 : and contents Tissue Gallbladder TISSUE EXAM Kandice Brunner, DO 2021 1612        Estimated Blood Loss:   Minimal    Drains:  Closed/Suction Drain Right Abdomen Bulb 19 Fr  (Active)   Number of days: 0       Anesthesia Type:   General    Operative Indications:  Biliary colic [T91 47]  Cholecystitis [K81 9]      Operative Findings:    Distended and edematous gallbladder with sludge  Complications:   None    Procedure and Technique:  The patient was brought to the operating arena and placed in supine position  All regular monitoring devices were connected  The patient underwent general anesthesia with endotracheal intubation without complication  The patient received perioperative antibiotics  The patient received subcutaneous heparin in addition to bilateral lower extremity sequential compression devices for DVT prophylaxis  A timeout was performed prior to incision to ensure correct patient position, procedure, and site  A vertical supraumbilical incision was made using a 15 blade scalpel  Incision was deepened through the deep dermis and simultaneous fat using electrocautery  Hemostasis was obtained  Using S retractors dissection was completed down to the fascia  The fascia was visualized grasped with Marian was elevated and incised  2 stay sutures of 0 Vicryl were then placed   A finger was inserted and the peritoneum palpated and using finger fracture technique the peritoneum was incised  The underside of the peritoneum was palpated and there was no evidence of any bowel or adhesions in the vicinity area  A Wilson trocar was then placed under direct vision  The abdomen was insufflated with carbon dioxide to a pressure of 12-14 mmHg  The patient tolerated insufflation well  A laparoscope was then entered and there was no evidence of any trauma from the initial trocar placement  The abdomen was inspected and there was noted to be some adhesions in the right upper quadrant  3 additional trochars were then placed in the following positions: A 5mm trocar was placed in the epigastrium  At this point a scissor was inserted and using the Endo Juancarlos the adhesions were taken down sharply  This allowed for more room the right upper quadrant to place the 2 additional trocars  2 additional 5 mm trochars were placed on the right costal margin  All trochars were inserted under direct vision and there is no evidence of any injury  The patient was then placed in reverse trendelenberg and right side up position  An atraumatic grasper was placed through the lateral port and the gallbladder was grasped and retracted over the dome of the liver  Any filmy adhesion between the gallbladder and omentum, and/or other nearby organs were taken down with a combination of blunt and sharp technique  Additional atraumatic grasper was then placed in the infundibulum of the gallbladder was grasped and retracted to the right lower quadrant  The peritoneum was then was incised using Bovie electrocautery  The cystic artery and cystic duct were then circumferentially dissected  The cystic artery cystic duct were then doubly clipped and divided close the gallbladder  Gallbladder was not densely adhered at the level what appeared to be the infundibulum  This was close to what was presumed the cystic duct  A top-down approach was then undertaken to free up the gallbladder more    In doing so a given the friability of the gallbladder the gallbladder was entered and there was spillage of bilious material   This was  Suctioned out  The entire gallbladder was then freed up and a left just a tubular like structure that was attached to the liver bed  It is unclear what this structure represented and if this was additional duct where the actual cystic duct  In addition there is question of whether not this was just densely adhered gallbladder to the liver bed  A small nick was made near the gallbladder where it turned into more of a tubular structure  It was clear that this was within the gallbladder and going to gallbladder   As there was a connection between this particular hole and the hole made in the gallbladder  While was being taken off the liver bed  As it was unclear what this structure represented and whether not it was just the last little bit of gallbladder that was attached or if it was in fact at tubular  structure leading to the biliary system  Thus it was decided to do an intraoperative cholangiogram   The catheter was inserted and secured within the  Tubular structure using a clip  Films were shot after contrast initiated and the contrast did not go anywhere  Thus at this point it was felt this was likely just a portion of gallbladder that was densely adhered  Further dissection was started at the liver bed near the common bile duct at the level of the cystic duct but it was felt this was unsafe  Thus the remnant just proximal to where the incision was made was clipped  The liver bed was then irrigated with copious amounts normal saline  Hemostasis of the liver bed was achieved with electrocautery  The gallbladder was in place an Endo Catch bag  What was initially thought was the the cystic artery was inspected and there is appeared to be no oozing and cystic duct was also inspected and the clips were intact and appeared to be no leakage of bile   The upper abdominal trochars were then removed under direct vision there is no bleeding from trocar site  The Wilson cannula was then removed along with the Endo Catch bag containing the gallbladder and the abdomen was allowed to desufflate  The fascia of the umbillical port was then closed using 0 Vicryl suture  The skin of all trochars were then closed with a 4-0 Monocryl  The patient tolerated procedure well was taken to the post anesthesia care unit in stable condition  All lap, needle, and instrument counts were correct       I was present for the entire procedure, A qualified resident physician was not available and A physician assistant was required during the procedure for retraction tissue handling,dissection and suturing    Patient Disposition:  PACU     SIGNATURE: Allen Gross DO  DATE: April 7, 2021  TIME: 6:33 PM

## 2021-04-07 NOTE — PLAN OF CARE
Problem: PAIN - ADULT  Goal: Verbalizes/displays adequate comfort level or baseline comfort level  Description: Interventions:  - Encourage patient to monitor pain and request assistance  - Assess pain using appropriate pain scale (0-10 pain scale)  - Administer analgesics based on type and severity of pain and evaluate response  - Implement non-pharmacological measures as appropriate and evaluate response  - Consider cultural and social influences on pain and pain management  - Notify physician/advanced practitioner if interventions unsuccessful or patient reports new pain  Outcome: Progressing     Problem: INFECTION - ADULT  Goal: Absence or prevention of progression during hospitalization  Description: INTERVENTIONS:  - Assess and monitor for signs and symptoms of infection  - Monitor lab/diagnostic results  - Monitor all insertion sites, i e  indwelling lines  - Administer medications as ordered  - Instruct and encourage patient and family to use good hand hygiene technique  Outcome: Progressing     Problem: SAFETY ADULT  Goal: Patient will remain free of falls  Description: INTERVENTIONS:  - Assess patient frequently for physical needs  -  Identify cognitive and physical deficits and behaviors that affect risk of falls  (IV line, surgery, post operative pain)  -  [place] fall precautions as indicated by assessment   (low fall risk)  - Educate patient/family on patient safety including physical limitations  - Instruct patient to call for assistance with activity based on assessment  - Modify environment to reduce risk of injury  Outcome: Progressing  Goal: Maintain or return to baseline ADL function  Description: INTERVENTIONS:  -  Assess patient's ability to carry out ADLs; (independent)   - Assess range of motion  - Assess patient's mobility; (independent)  - Encourage maximum independence but intervene and supervise when necessary  - Assess for home care needs following discharge   - Provide patient education as appropriate  Outcome: Progressing  Goal: Maintain or return mobility status to optimal level  Description: INTERVENTIONS:  - Assess patient's baseline mobility status (independent)    - Identify cognitive and physical deficits and behaviors that affect mobility(IV line, surgery, post operative pain)  - [place] fall precautions as indicated by assessment (low fall risk)  - Record patient progress and toleration of activity level on Mobility SBAR; progress patient to next Phase/Stage  - Instruct patient to call for assistance with activity based on assessment  Outcome: Progressing     Problem: DISCHARGE PLANNING  Goal: Discharge to home or other facility with appropriate resources  Description: INTERVENTIONS:  - Identify barriers to discharge w/patient and caregiver  - Arrange for needed discharge resources and transportation as appropriate  - Identify discharge learning needs (meds, wound care, etc )  - Refer to Case Management Department for coordinating discharge planning if the patient needs post-hospital services based on physician/advanced practitioner order or complex needs related to functional status, cognitive ability, or social support system  Outcome: Progressing     Problem: Knowledge Deficit  Goal: Patient/family/caregiver demonstrates understanding of disease process, treatment plan, medications, and discharge instructions  Description: Complete learning assessment and assess knowledge base    Interventions:  - Provide teaching at level of understanding  - Provide teaching via preferred learning methods  Outcome: Progressing     Problem: GASTROINTESTINAL - ADULT  Goal: Maintains adequate nutritional intake  Description: INTERVENTIONS:  - Monitor percentage of each meal consumed  - Identify factors contributing to decreased intake, treat as appropriate  - Assist with meals as needed  - Monitor I&O, weight, and lab values if indicated  - Obtain nutrition services referral as needed  Outcome: Progressing

## 2021-04-07 NOTE — PROGRESS NOTES
Progress Note - General Surgery   Sanjiv Mckeon 45 y o  female MRN: [id number]  Unit/Bed#: 420-01 Encounter: [id number]  Date Admitted to Hospital: 4/6/2021  Hospital Day #: 2    Assessment:  Sanjiv Mckeon is a 45 y o  female with history of biliary colic presenting with:    Biliary dyskinesia  -Hx of episode of biliary colic, planned for elective lap laila, but pushed off due to pandemic  -Presented to ED 4/6 w/ epigastric/RUQ pain + n/v  -3/17/2020 [facility] HIDA scan: abnormal contractile response, GB EF 11%  -4/6 FU US GB: no sonographic evidence for cholelithiasis or acute cholecystitis  -4/6 CTAP: GB wall thickening w/ min pericholecystic fluid, no calculi  -Leukocytosis 18 POA  -AVSS  -awaiting bmp, cbc results this am  -abdominal pain much improved, no n/v today    SARS-COV-2 Positive  -COVID positive test on 2/8/2021 (test within 90 days)  -asymptomatic; denies any sob, cough, fevers/chills, muscle aches  -per CDC guidelines: "uncommon for reinfection with an initial 90 days after symptom onset of preceding infection, therefore recovered from SARS-CoV-2 infection with positive SARS-CoV-2 RT-PCR result without new symptoms is likely secondary to viral RNA shutting rather than reinfection"  -no need for contact precaution/isolation at this time, reconsider if becomes symptomatic    Plan:  -OR this afternoon for lap cholecystectomy poss open  -void on call to OR  -NPO   -IVF  -analgesia, antiemetics prn  -IV protonix  -DVT ppx, sqh  -SCDs  -OOB ambulation      Subjective/Objective   CC:  Feeling better, ready for surgery today  Subjective:  Patient feeling well this morning, slept okay overnight, pain very minimal maybe 1/10  No more episodes of nausea/vomiting  Had diarrhea yesterday but has had no bowel movement since, but is passing flatus  Voiding without issues  Denies any subjective fevers/chills, sob, coughing, chest pain, palpitations, n/v/d/c, dysuria, or BLE tenderness    Getting OB and ambulating  Has been NPO since midnight  No questions or concerns regarding surgery today, is agreeable for procedure  Previous abdominal surgeries include  and diagnostic laparoscopy for endometriosis  Objective:   Blood pressure 116/77, pulse 80, temperature 98 7 °F (37 1 °C), resp  rate 17, height 5' 2" (1 575 m), weight 91 5 kg (201 lb 11 5 oz), last menstrual period 03/10/2021, SpO2 94 %  ,Body mass index is 36 9 kg/m²  Intake/Output Summary (Last 24 hours) at 2021 0901  Last data filed at 2021 2345  Gross per 24 hour   Intake 3560 ml   Output --   Net 3560 ml       Invasive Devices     Peripheral Intravenous Line            Peripheral IV 21 Left Antecubital less than 1 day                Physical Exam:   Physical Exam  Vitals signs and nursing note reviewed  Constitutional:       General: She is not in acute distress  Appearance: Normal appearance  Comments: Seated upright in bed comfortably, pleasant cooperative during exam, PCA present taking vital signs   HENT:      Head: Normocephalic and atraumatic  Right Ear: External ear normal       Left Ear: External ear normal    Eyes:      General: No scleral icterus  Extraocular Movements: Extraocular movements intact  Conjunctiva/sclera: Conjunctivae normal    Neck:      Musculoskeletal: Normal range of motion and neck supple  Cardiovascular:      Rate and Rhythm: Normal rate and regular rhythm  Heart sounds: Normal heart sounds  No murmur  No friction rub  No gallop  Pulmonary:      Effort: Pulmonary effort is normal       Breath sounds: Normal breath sounds  No wheezing, rhonchi or rales  Abdominal:      General: Bowel sounds are normal  There is no distension  Palpations: Abdomen is soft  There is no mass  Tenderness: There is no abdominal tenderness  There is no guarding or rebound  Musculoskeletal: Normal range of motion  General: No swelling or tenderness        Right lower leg: No edema  Left lower leg: No edema  Comments: SCDs in place bilaterally   Skin:     General: Skin is warm and dry  Coloration: Skin is not jaundiced  Neurological:      General: No focal deficit present  Mental Status: She is alert and oriented to person, place, and time  Psychiatric:         Mood and Affect: Mood normal          Behavior: Behavior normal          Thought Content: Thought content normal          Lab, Imaging and other studies:I have personally reviewed pertinent lab results  Us Gallbladder  Result Date: 4/6/2021  Impression: No sonographic evidence for cholelithiasis or acute cholecystitis  Workstation performed: [id number]     Ct Abdomen Pelvis With Contrast  Result Date: 4/6/2021  Impression: Findings in the gallbladder suggestive of cholecystitis  Consider ultrasound for further evaluation   Workstation performed: [id number]     VTE Pharmacologic Prophylaxis: Heparin  VTE Mechanical Prophylaxis: sequential compression device    Recent Results (from the past 36 hour(s))   POCT pregnancy, urine    Collection Time: 04/06/21  9:12 AM   Result Value Ref Range    EXT PREG TEST UR (Ref: Negative) negative     Control valid    UA w Reflex to Microscopic w Reflex to Culture    Collection Time: 04/06/21  9:12 AM    Specimen: Urine, Clean Catch   Result Value Ref Range    Color, UA Yellow     Clarity, UA Slightly Cloudy     Specific [place], UA 1 015 1 003 - 1 030    pH, UA 5 5 4 5, 5 0, 5 5, 6 0, 6 5, 7 0, 7 5, 8 0    Leukocytes, UA Negative Negative    Nitrite, UA Negative Negative    Protein, UA Negative Negative mg/dl    Glucose, UA Negative Negative mg/dl    Ketones, UA Negative Negative mg/dl    Urobilinogen, UA 0 2 0 2, 1 0 E U /dl E U /dl    Bilirubin, UA Negative Negative    Blood, UA Negative Negative   CBC and differential    Collection Time: 04/06/21  9:16 AM   Result Value Ref Range    WBC 18 36 (H) 4 31 - 10 16 Thousand/uL    RBC 4 93 3 81 - 5 12 Million/uL    Hemoglobin 14 0 11 5 - 15 4 g/dL    Hematocrit 43 3 34 8 - 46 1 %    MCV 88 82 - 98 fL    MCH 28 4 26 8 - 34 3 pg    MCHC 32 3 31 4 - 37 4 g/dL    RDW 14 3 11 6 - 15 1 %    MPV 10 0 8 9 - 12 7 fL    Platelets 755 (H) 792 - 390 Thousands/uL    nRBC 0 /100 WBCs    Neutrophils Relative 79 (H) 43 - 75 %    Immat GRANS % 1 0 - 2 %    Lymphocytes Relative 13 (L) 14 - 44 %    Monocytes Relative 6 4 - 12 %    Eosinophils Relative 1 0 - 6 %    Basophils Relative 0 0 - 1 %    Neutrophils Absolute 14 62 (H) 1 85 - 7 62 Thousands/µL    Immature Grans Absolute 0 10 0 00 - 0 20 Thousand/uL    Lymphocytes Absolute 2 37 0 60 - 4 47 Thousands/µL    Monocytes Absolute 1 07 0 17 - 1 22 Thousand/µL    Eosinophils Absolute 0 16 0 00 - 0 61 Thousand/µL    Basophils Absolute 0 04 0 00 - 0 10 Thousands/µL   Comprehensive metabolic panel    Collection Time: 04/06/21  9:16 AM   Result Value Ref Range    Sodium 139 136 - 145 mmol/L    Potassium 4 2 3 5 - 5 3 mmol/L    Chloride 105 100 - 108 mmol/L    CO2 26 21 - 32 mmol/L    ANION GAP 8 4 - 13 mmol/L    BUN 15 5 - 25 mg/dL    Creatinine 0 83 0 60 - 1 30 mg/dL    Glucose 100 65 - 140 mg/dL    Calcium 8 8 8 3 - 10 1 mg/dL    AST 27 5 - 45 U/L    ALT 43 12 - 78 U/L    Alkaline Phosphatase 82 46 - 116 U/L    Total Protein 8 0 6 4 - 8 2 g/dL    Albumin 4 0 3 5 - 5 0 g/dL    Total Bilirubin 0 40 0 20 - 1 00 mg/dL    eGFR 89 ml/min/1 73sq m   Lipase    Collection Time: 04/06/21  9:16 AM   Result Value Ref Range    Lipase 89 73 - 393 u/L   COVID19, Influenza A/B, RSV PCR, [facility]    Collection Time: 04/06/21 12:00 PM    Specimen: Nasopharyngeal Swab; Nares   Result Value Ref Range    SARS-CoV-2 Positive (A) Negative    INFLUENZA A PCR Negative Negative    INFLUENZA B PCR Negative Negative    RSV PCR Negative Negative       Kelly Harkins PA-C  4/7/2021   9:21 AM

## 2021-04-08 ENCOUNTER — APPOINTMENT (OUTPATIENT)
Dept: MRI IMAGING | Facility: HOSPITAL | Age: 41
End: 2021-04-08
Payer: COMMERCIAL

## 2021-04-08 VITALS
TEMPERATURE: 98.4 F | RESPIRATION RATE: 17 BRPM | HEIGHT: 62 IN | SYSTOLIC BLOOD PRESSURE: 122 MMHG | WEIGHT: 201.72 LBS | OXYGEN SATURATION: 95 % | BODY MASS INDEX: 37.12 KG/M2 | HEART RATE: 87 BPM | DIASTOLIC BLOOD PRESSURE: 58 MMHG

## 2021-04-08 PROBLEM — R74.01 TRANSAMINITIS: Status: ACTIVE | Noted: 2021-04-08

## 2021-04-08 LAB
ALBUMIN SERPL BCP-MCNC: 3 G/DL (ref 3.5–5)
ALP SERPL-CCNC: 70 U/L (ref 46–116)
ALT SERPL W P-5'-P-CCNC: 101 U/L (ref 12–78)
ANION GAP SERPL CALCULATED.3IONS-SCNC: 10 MMOL/L (ref 4–13)
AST SERPL W P-5'-P-CCNC: 128 U/L (ref 5–45)
BASOPHILS # BLD AUTO: 0.02 THOUSANDS/ΜL (ref 0–0.1)
BASOPHILS NFR BLD AUTO: 0 % (ref 0–1)
BILIRUB SERPL-MCNC: 0.23 MG/DL (ref 0.2–1)
BUN SERPL-MCNC: 7 MG/DL (ref 5–25)
CALCIUM ALBUM COR SERPL-MCNC: 8.8 MG/DL (ref 8.3–10.1)
CALCIUM SERPL-MCNC: 8 MG/DL (ref 8.3–10.1)
CHLORIDE SERPL-SCNC: 110 MMOL/L (ref 100–108)
CO2 SERPL-SCNC: 24 MMOL/L (ref 21–32)
CREAT SERPL-MCNC: 0.85 MG/DL (ref 0.6–1.3)
EOSINOPHIL # BLD AUTO: 0 THOUSAND/ΜL (ref 0–0.61)
EOSINOPHIL NFR BLD AUTO: 0 % (ref 0–6)
ERYTHROCYTE [DISTWIDTH] IN BLOOD BY AUTOMATED COUNT: 14.5 % (ref 11.6–15.1)
GFR SERPL CREATININE-BSD FRML MDRD: 87 ML/MIN/1.73SQ M
GLUCOSE SERPL-MCNC: 123 MG/DL (ref 65–140)
HCT VFR BLD AUTO: 33.7 % (ref 34.8–46.1)
HGB BLD-MCNC: 10.5 G/DL (ref 11.5–15.4)
IMM GRANULOCYTES # BLD AUTO: 0.04 THOUSAND/UL (ref 0–0.2)
IMM GRANULOCYTES NFR BLD AUTO: 0 % (ref 0–2)
LYMPHOCYTES # BLD AUTO: 1.43 THOUSANDS/ΜL (ref 0.6–4.47)
LYMPHOCYTES NFR BLD AUTO: 11 % (ref 14–44)
MCH RBC QN AUTO: 28.2 PG (ref 26.8–34.3)
MCHC RBC AUTO-ENTMCNC: 31.2 G/DL (ref 31.4–37.4)
MCV RBC AUTO: 90 FL (ref 82–98)
MONOCYTES # BLD AUTO: 0.92 THOUSAND/ΜL (ref 0.17–1.22)
MONOCYTES NFR BLD AUTO: 7 % (ref 4–12)
NEUTROPHILS # BLD AUTO: 10.75 THOUSANDS/ΜL (ref 1.85–7.62)
NEUTS SEG NFR BLD AUTO: 82 % (ref 43–75)
NRBC BLD AUTO-RTO: 0 /100 WBCS
PLATELET # BLD AUTO: 302 THOUSANDS/UL (ref 149–390)
PMV BLD AUTO: 10.3 FL (ref 8.9–12.7)
POTASSIUM SERPL-SCNC: 4 MMOL/L (ref 3.5–5.3)
PROT SERPL-MCNC: 6.3 G/DL (ref 6.4–8.2)
RBC # BLD AUTO: 3.73 MILLION/UL (ref 3.81–5.12)
SODIUM SERPL-SCNC: 144 MMOL/L (ref 136–145)
WBC # BLD AUTO: 13.16 THOUSAND/UL (ref 4.31–10.16)

## 2021-04-08 PROCEDURE — 80053 COMPREHEN METABOLIC PANEL: CPT | Performed by: SURGERY

## 2021-04-08 PROCEDURE — 85025 COMPLETE CBC W/AUTO DIFF WBC: CPT | Performed by: SURGERY

## 2021-04-08 PROCEDURE — 99024 POSTOP FOLLOW-UP VISIT: CPT | Performed by: SURGERY

## 2021-04-08 PROCEDURE — G1004 CDSM NDSC: HCPCS

## 2021-04-08 PROCEDURE — 76376 3D RENDER W/INTRP POSTPROCES: CPT

## 2021-04-08 PROCEDURE — 74181 MRI ABDOMEN W/O CONTRAST: CPT

## 2021-04-08 PROCEDURE — 99217 PR OBSERVATION CARE DISCHARGE MANAGEMENT: CPT | Performed by: PHYSICIAN ASSISTANT

## 2021-04-08 RX ORDER — OXYCODONE HYDROCHLORIDE 5 MG/1
5 TABLET ORAL EVERY 6 HOURS PRN
Qty: 8 TABLET | Refills: 0 | Status: SHIPPED | OUTPATIENT
Start: 2021-04-08

## 2021-04-08 RX ORDER — ACETAMINOPHEN 325 MG/1
650 TABLET ORAL EVERY 6 HOURS PRN
Qty: 30 TABLET | Refills: 0 | Status: SHIPPED | OUTPATIENT
Start: 2021-04-08

## 2021-04-08 RX ORDER — ACETAMINOPHEN 325 MG/1
650 TABLET ORAL EVERY 6 HOURS PRN
Status: DISCONTINUED | OUTPATIENT
Start: 2021-04-08 | End: 2021-04-08 | Stop reason: HOSPADM

## 2021-04-08 RX ORDER — DOCUSATE SODIUM 100 MG/1
100 CAPSULE, LIQUID FILLED ORAL ONCE
Status: COMPLETED | OUTPATIENT
Start: 2021-04-08 | End: 2021-04-08

## 2021-04-08 RX ADMIN — MORPHINE SULFATE 2 MG: 2 INJECTION, SOLUTION INTRAMUSCULAR; INTRAVENOUS at 12:53

## 2021-04-08 RX ADMIN — PANTOPRAZOLE SODIUM 20 MG: 20 TABLET, DELAYED RELEASE ORAL at 05:28

## 2021-04-08 RX ADMIN — DOCUSATE SODIUM 100 MG: 100 CAPSULE, LIQUID FILLED ORAL at 16:20

## 2021-04-08 RX ADMIN — HEPARIN SODIUM 5000 UNITS: 5000 INJECTION INTRAVENOUS; SUBCUTANEOUS at 05:28

## 2021-04-08 RX ADMIN — SODIUM CHLORIDE 100 ML/HR: 0.9 INJECTION, SOLUTION INTRAVENOUS at 04:15

## 2021-04-08 RX ADMIN — HEPARIN SODIUM 5000 UNITS: 5000 INJECTION INTRAVENOUS; SUBCUTANEOUS at 14:15

## 2021-04-08 RX ADMIN — MORPHINE SULFATE 2 MG: 2 INJECTION, SOLUTION INTRAMUSCULAR; INTRAVENOUS at 05:37

## 2021-04-08 RX ADMIN — MORPHINE SULFATE 2 MG: 2 INJECTION, SOLUTION INTRAMUSCULAR; INTRAVENOUS at 16:19

## 2021-04-08 NOTE — DISCHARGE INSTRUCTIONS
Laparoscopic Cholecystectomy   WHAT YOU NEED TO KNOW:   Laparoscopic cholecystectomy is surgery to remove your gallbladder  DISCHARGE INSTRUCTIONS:   Medicines: You may need any of the following:  · Prescription pain medicine  helps decrease pain  Do not wait until the pain is severe before you take this medicine  · NSAIDs  decrease swelling and pain  This medicine can be bought with or without a doctor's order  This medicine can cause stomach bleeding or kidney problems in certain people  If you take blood thinner medicine, always ask your healthcare provider if NSAIDs are safe for you  Read the medicine label and follow the directions on it before using this medicine  · Take your medicine as directed  Contact your healthcare provider if you think your medicine is not helping or if you have side effects  Tell him or her if you are allergic to any medicine  Keep a list of the medicines, vitamins, and herbs you take  Include the amounts, and when and why you take them  Bring the list or the pill bottles to follow-up visits  Carry your medicine list with you in case of an emergency  Follow up with your healthcare provider 2 weeks after surgery, or as directed:  Write down your questions so you remember to ask them during your visits  Wound care: You may remove the dressings the day after discharge  Keep the wounds clean and dry  You may take a shower the day after your surgery (but keep the drain site as dry as possible while showering)  Do NOT take a bath or soak in pools of water  Drain: Empty the drain daily  You may empty it more frequently if it gets full  What to eat after surgery:  Eat low-fat foods for 4 to 6 weeks while your body learns to digest fat without a gallbladder  Slowly increase the amount of fat that you eat  Drink plenty of liquids  Ask how much liquid to drink and which liquids are best for you  When to return to work and other activities:   You may return to work or other activities as soon as your pain is controlled and you feel comfortable  For many people, this is 5 to 7 days after surgery  Contact your healthcare provider if:   · You have a fever over 101°F (38°C) or chills  · You have pain or nausea that is not relieved by medicine  · You have redness and swelling around your incisions, or blood or pus is leaking from your incisions  · You are constipated or have diarrhea  · Your skin or eyes are yellow, or your bowel movements are pale  · You have questions or concerns about your surgery, condition, or care  Seek care immediately or call 911 if:   · You cannot stop vomiting  · Your bowel movements are black or bloody  · You have pain in your abdomen and it is swollen or hard  · Your arm or leg feels warm, tender, and painful  It may look swollen and red  · You feel lightheaded, short of breath, and have chest pain  · You cough up blood  © Copyright [employer] 2018 Information is for End User's use only and may not be sold, redistributed or otherwise used for commercial purposes  All illustrations and images included in CareNotes® are the copyrighted property of [employer]  or [address] [place], [place] [address]eugenio   The above information is an  only  It is not intended as medical advice for individual conditions or treatments  Talk to your doctor, nurse or pharmacist before following any medical regimen to see if it is safe and effective for you

## 2021-04-08 NOTE — NURSING NOTE
Patient discharged to home  Asked for pain medication (Morphine) prior to leaving   Spoke with Dr Leah Cantu, see orders

## 2021-04-08 NOTE — PLAN OF CARE
Problem: PAIN - ADULT  Goal: Verbalizes/displays adequate comfort level or baseline comfort level  Description: Interventions:  - Encourage patient to monitor pain and request assistance  - Assess pain using appropriate pain scale (0-10 pain scale)  - Administer analgesics based on type and severity of pain and evaluate response  - Implement non-pharmacological measures as appropriate and evaluate response  - Consider cultural and social influences on pain and pain management  - Notify physician/advanced practitioner if interventions unsuccessful or patient reports new pain  Outcome: Progressing     Problem: INFECTION - ADULT  Goal: Absence or prevention of progression during hospitalization  Description: INTERVENTIONS:  - Assess and monitor for signs and symptoms of infection  - Monitor lab/diagnostic results  - Monitor all insertion sites, i e  indwelling lines, drain  - Administer medications as ordered  - Instruct and encourage patient and family to use good hand hygiene technique  Outcome: Progressing     Problem: SAFETY ADULT  Goal: Patient will remain free of falls  Description: INTERVENTIONS:  - Assess patient frequently for physical needs  -  Identify cognitive and physical deficits and behaviors that affect risk of falls  (IV line, surgery, post operative pain)  -  [place] fall precautions as indicated by assessment  (moderate fall risk)  - Educate patient/family on patient safety including physical limitations  - Instruct patient to call for assistance with activity based on assessment  - Modify environment to reduce risk of injury  Outcome: Progressing  Goal: Maintain or return to baseline ADL function  Description: INTERVENTIONS:  -  Assess patient's ability to carry out ADLs; (independent)   - Assess range of motion  - Assess patient's mobility; (independent)  - Encourage maximum independence but intervene and supervise when necessary  - Assess for home care needs following discharge   - Provide patient education as appropriate  Outcome: Progressing  Goal: Maintain or return mobility status to optimal level  Description: INTERVENTIONS:  - Assess patient's baseline mobility status (independent)    - Identify cognitive and physical deficits and behaviors that affect mobility(IV line, surgery, post operative pain)  - [place] fall precautions as indicated by assessment (moderate fall risk)  - Record patient progress and toleration of activity level on Mobility SBAR; progress patient to next Phase/Stage  - Instruct patient to call for assistance with activity based on assessment  Outcome: Progressing     Problem: DISCHARGE PLANNING  Goal: Discharge to home or other facility with appropriate resources  Description: INTERVENTIONS:  - Identify barriers to discharge w/patient and caregiver  - Arrange for needed discharge resources and transportation as appropriate  - Identify discharge learning needs (meds, wound care, etc )  - Refer to Case Management Department for coordinating discharge planning if the patient needs post-hospital services based on physician/advanced practitioner order or complex needs related to functional status, cognitive ability, or social support system  Outcome: Progressing     Problem: Knowledge Deficit  Goal: Patient/family/caregiver demonstrates understanding of disease process, treatment plan, medications, and discharge instructions  Description: Complete learning assessment and assess knowledge base    Interventions:  - Provide teaching at level of understanding  - Provide teaching via preferred learning methods  Outcome: Progressing     Problem: GASTROINTESTINAL - ADULT  Goal: Maintains adequate nutritional intake  Description: INTERVENTIONS:  - Monitor percentage of each meal consumed  - Identify factors contributing to decreased intake, treat as appropriate  - Assist with meals as needed  - Monitor I&O, weight, and lab values if indicated  - Obtain nutrition services referral as needed  Outcome: Progressing     Problem: SKIN/TISSUE INTEGRITY - ADULT  Goal: Incision(s), wounds(s) or drain site(s) healing without S/S of infection  Description: INTERVENTIONS  - Assess and document risk factors for skin impairment   - Assess and document dressing, incision, wound bed, drain sites and surrounding tissue(trochar sites and edith drain)  - Consider nutrition services referral as needed  - Provide patient/ family education  Outcome: Progressing     Problem: Potential for Falls  Goal: Patient will remain free of falls  Description: INTERVENTIONS:  - Assess patient frequently for physical needs  -  Identify cognitive and physical deficits and behaviors that affect risk of falls  (IV line, surgery, post operative pain)  -  [place] fall precautions as indicated by assessment  (moderate fall risk)  - Educate patient/family on patient safety including physical limitations  - Instruct patient to call for assistance with activity based on assessment  - Modify environment to reduce risk of injury  Outcome: Progressing

## 2021-04-08 NOTE — CASE MANAGEMENT
Spoke with pt in room States she is waiting to have another test done  She currently has a ASHLEY drain  She does not want home health care and is able to learn how to empty drain  Informed primary nurse Eladia Oh that is pt goes home later today with ASHLEY drain to educate her on same  CM to follow

## 2021-04-08 NOTE — UTILIZATION REVIEW
Continued Stay Review    Date: 4/8/21             Current Patient Class: Observation  Current Level of Care: Med Surg    HPI:40 y o  female initially admitted as Observation on 4/6/21 after presenting to the ED with epigastric and RUQ pain associated with vomiting  Patient with history of previous episodes of gallbladder pain and biliary colic  To OR 4/7 for laparoscopic cholecystectomy  4/8/21 POD1 s/p laparoscopic cholecystectomy with operative finding of gallbladder sludge  Elevated AST/ALT (128/101)  Tbili 0 23  Leukocytosis 13 16 (7 87), likely inflammatory response to surgery  ASHLEY drain with 30cc serosanguinous fluid in bulb this AM  Afebrile  Plan: MRCP this AM to further evaluate possible cause of increased AST/ALT, Further treatment options pending results of MRCP  Marshel Handler IVF hydration  Monitor I/Os  OOB ambulating            Pertinent Labs/Diagnostic Results:   Results from last 7 days   Lab Units 04/06/21  1200   SARS-COV-2  Positive*     Results from last 7 days   Lab Units 04/08/21  0522 04/07/21  0922 04/06/21  0916   WBC Thousand/uL 13 16* 7 87 18 36*   HEMOGLOBIN g/dL 10 5* 11 0* 14 0   HEMATOCRIT % 33 7* 34 0* 43 3   PLATELETS Thousands/uL 302 283 430*   NEUTROS ABS Thousands/µL 10 75* 5 16 14 62*         Results from last 7 days   Lab Units 04/08/21  0542 04/07/21  0922 04/06/21  0916   SODIUM mmol/L 144 140 139   POTASSIUM mmol/L 4 0 3 8 4 2   CHLORIDE mmol/L 110* 109* 105   CO2 mmol/L 24 26 26   ANION GAP mmol/L 10 5 8   BUN mg/dL 7 7 15   CREATININE mg/dL 0 85 0 79 0 83   EGFR ml/min/1 73sq m 87 95 89   CALCIUM mg/dL 8 0* 8 1* 8 8     Results from last 7 days   Lab Units 04/08/21  0542 04/07/21  0922 04/06/21  0916   AST U/L 128* 13 27   ALT U/L 101* 25 43   ALK PHOS U/L 70 58 82   TOTAL PROTEIN g/dL 6 3* 6 1* 8 0   ALBUMIN g/dL 3 0* 3 0* 4 0   TOTAL BILIRUBIN mg/dL 0 23 0 28 0 40         Results from last 7 days   Lab Units 04/08/21  0542 04/07/21  0922 04/06/21  0916   GLUCOSE RANDOM mg/dL 123 95 100                     Results from last 7 days   Lab Units 04/06/21  0916   LIPASE u/L 89             Results from last 7 days   Lab Units 04/06/21  0912   CLARITY UA  Slightly Cloudy   COLOR UA  Yellow   SPEC GRAV UA  1 015   PH UA  5 5   GLUCOSE UA mg/dl Negative   KETONES UA mg/dl Negative   BLOOD UA  Negative   PROTEIN UA mg/dl Negative   NITRITE UA  Negative   BILIRUBIN UA  Negative   UROBILINOGEN UA E U /dl 0 2   LEUKOCYTES UA  Negative     Results from last 7 days   Lab Units 04/06/21  1200   INFLUENZA A PCR  Negative   INFLUENZA B PCR  Negative   RSV PCR  Negative             Vital Signs:     Date/Time  Temp  Pulse  Resp  BP  MAP   SpO2  Calculated FIO2 (%) - Nasal Cannula  O2 Flow Rate (L/min)  Nasal Cannula O2 Flow Rate (L/min)  O2 Device   04/08/21 0648  98 6 °F (37 °C)  88  17  126/72  88  95 %  --  --  --  --   04/07/21 22:14:59  99 °F (37 2 °C)  94  --  125/68  87  93 %  --  --  --  --   04/07/21 2043  --  --  --  --  --  96 %  --  --  --  None (Room air)   04/07/21 1900  --  81  16  125/62  --  --  --  --  --  --   04/07/21 1845  97 7 °F (36 5 °C)  85  10Abnormal   105/54  --  96 %  28  --  2 L/min  Nasal cannula   04/07/21 1830  --  86  11Abnormal   112/56  --  97 %  28  --  2 L/min  Nasal cannula   04/07/21 1824  98 1 °F (36 7 °C)  --  --  --  --  --  --  --  --  --   04/07/21 1819  --  96  12  110/54  78  100 %  --  6 L/min  --  Simple mask           Medications:   Scheduled Medications:    heparin (porcine), 5,000 Units, Subcutaneous, Q8H ANA  pantoprazole, 20 mg, Oral, Early Morning      Continuous IV Infusions:    sodium chloride, 100 mL/hr, Intravenous, Continuous      PRN Meds:    iohexol, 3 5 mL, Intravenous, Once in imaging  morphine injection, 4 mg, Intravenous, Q4H PRN  morphine injection, 2 mg, Intravenous, Q4H PRN x 1 dose 4/8 @ 0537  ondansetron, 4 mg, Intravenous, Q6H PRN        Discharge Plan: TBD          Network Utilization Review Department  ATTENTION: Please call with any questions or concerns to [phone] and carefully listen to the prompts so that you are directed to the right person  All voicemails are confidential   [address]ta [address] all requests for admission clinical reviews, approved or denied determinations and any other requests to dedicated fax number below belonging to the campus where the patient is receiving treatment   List of dedicated fax numbers for the Facilities:  [address] DENIALS (Administrative/Medical Necessity) [phone]   [address] (Maternity/NICU/Pediatrics) [phone] [address] [place] [address] [address] (Annelle Buerger) [address] [place] [address] [address] [place] [address] [phone]

## 2021-04-08 NOTE — PROGRESS NOTES
Progress Note - General Surgery   Raj Shultz 45 y o  female MRN: [id number]  Unit/Bed#: 420-01 Encounter: [id number]    Assessment:  POD1 s/p laparoscopic cholecystectomy with intraoperative cholangiogram  Elevated AST/ALT (128/101)  Tbili 0 23  Leukocytosis 13 16 (7 87), likely inflammatory response to surgery  ASHLEY drain with 30cc serosanguinous fluid in bulb this AM  Afebrile    Plan: Will be seen by Dr Nohelia Mari as well  MRCP this AM to further evaluate possible cause of increased AST/ALT  Further treatment options pending results of MRCP  [facility] IVF hydration  Monitor I/Os  OOB ambulating  Will discharge to home with ASHLEY drain  Follow up with Dr Nohelia Mari on Monday 4/12      Subjective/Objective     Subjective: No acute events overnight  Tolerating diet without issue  Mild incisional tenderness  Denies significant pain  Denies fevers/chills  Objective:     Blood pressure 126/72, pulse 88, temperature 98 6 °F (37 °C), temperature source Oral, resp  rate 17, height 5' 2" (1 575 m), weight 91 5 kg (201 lb 11 5 oz), last menstrual period 03/10/2021, SpO2 95 %, not currently breastfeeding  ,Body mass index is 36 9 kg/m²  Intake/Output Summary (Last 24 hours) at 4/8/2021 3445  Last data filed at 4/8/2021 0251  Gross per 24 hour   Intake 2000 ml   Output 1230 ml   Net 770 ml       Invasive Devices     Peripheral Intravenous Line            Peripheral IV 04/06/21 Left Antecubital 1 day          Drain            Closed/Suction Drain Right Abdomen Bulb 19 Fr  less than 1 day                Physical Exam:   Gen: AxOx3  Heart: RRR  Lungs: CTA  Abd: soft, minimal incisional tenderness, no guarding or rebound, bowel sounds present, incisions are C/D/I  ASHLEY drain with 30cc serosanguinous fluid in bulb    Lab, Imaging and other studies:  I have personally reviewed pertinent lab results      CBC:   Lab Results   Component Value Date    WBC 13 16 (H) 04/08/2021    HGB 10 5 (L) 04/08/2021    HCT 33 7 (L) 04/08/2021 MCV 90 04/08/2021     04/08/2021    MCH 28 2 04/08/2021    MCHC 31 2 (L) 04/08/2021    RDW 14 5 04/08/2021    MPV 10 3 04/08/2021    NRBC 0 04/08/2021     CMP:   Lab Results   Component Value Date    SODIUM 144 04/08/2021    K 4 0 04/08/2021     (H) 04/08/2021    CO2 24 04/08/2021    BUN 7 04/08/2021    CREATININE 0 85 04/08/2021    CALCIUM 8 0 (L) 04/08/2021     (H) 04/08/2021     (H) 04/08/2021    ALKPHOS 70 04/08/2021    EGFR 87 04/08/2021     VTE Pharmacologic Prophylaxis: Heparin  VTE Mechanical Prophylaxis: sequential compression device       Megan Arita PA-C

## 2021-04-08 NOTE — ASSESSMENT & PLAN NOTE
Noted on POD #1, prompting a MRCP  Mri Abdomen Wo Contrast And Mrcp  Result Date: 4/8/2021  Impression: The common bile duct is of normal size with no evidence of choledocholithiasis There is no fluid collection seen along the course of common bile duct or in Morison's pouch  A ASHLEY drain is noted terminating in the Morison's pouch with no collection demonstrated around the ASHLEY drain  There is fluid noted within the gallbladder fossa with hypointense foci that these may represent combination of surgical clips, air Follow-up CT may be considered as needed  I personally discussed this study with ST EDIN TERRELL on 4/8/2021 at 3:31 PM   Workstation performed: [id number]  Suspect this is due to post-operative inflammation  Consider repeat CMP  Will defer this to surgery team to determine if needed at post operative follow up

## 2021-04-08 NOTE — DISCHARGE SUMMARY
[facility] [address] [place]  Discharge- Yulisa Up 1980, 45 y o  female MRN: [id number]  Unit/Bed#: 420-01 Encounter: [id number]  Primary Care Provider: Skip Real DO   Date and time admitted to hospital: 4/6/2021  8:19 AM    * Biliary colic  Assessment & Plan  Patient with epigastric / RUQ pain and nausea/vomiting  RUQ Ultrasound without acute pathology  However, Previous HIDA revealed biliary dyskinesia  General surgery consultation appreciated- plan for laparoscopic cholecystectomy 7/9/80 without complication  Will be discharged home with ASHLEY drain in place  Plan for postoperative appointment on 4/12/21  PRN pain control with oxycodone and can alternate with PO tylenol and ibuprofen if needed  Received Ancef and Flagyl while here  No need for further antibiotics on discharge  Transaminitis  Assessment & Plan  Noted on POD #1, prompting a MRCP  Mri Abdomen Wo Contrast And Mrcp  Result Date: 4/8/2021  Impression: The common bile duct is of normal size with no evidence of choledocholithiasis There is no fluid collection seen along the course of common bile duct or in Morison's pouch  A ASHLEY drain is noted terminating in the Morison's pouch with no collection demonstrated around the ASHLEY drain  There is fluid noted within the gallbladder fossa with hypointense foci that these may represent combination of surgical clips, air Follow-up CT may be considered as needed  I personally discussed this study with ST EDIN TERRELL on 4/8/2021 at 3:31 PM   Workstation performed: [id number]  Suspect this is due to post-operative inflammation  Consider repeat CMP  Will defer this to surgery team to determine if needed at post operative follow up             Discharging Physician / Practitioner: Sarah Mcclure PA-C  PCP: Skip Real DO  Admission Date:   Admission Orders (From admission, onward)     Ordered        04/06/21 1153  Place in Observation  Once         04/06/21 [facility] in Observation  Once                   Discharge Date: 04/08/21    Resolved Problems  Date Reviewed: 4/8/2021    None          Consultations During Hospital Stay:  · General surgery    Procedures Performed:   · Laparoscopic cholecystectomy      Significant Findings / Test Results:   Us Gallbladder  Result Date: 4/6/2021  Impression: No sonographic evidence for cholelithiasis or acute cholecystitis  Workstation performed: [id number]     Mri Abdomen Wo Contrast And Mrcp  Result Date: 4/8/2021  Impression: The common bile duct is of normal size with no evidence of choledocholithiasis There is no fluid collection seen along the course of common bile duct or in Morison's pouch  A ASHLEY drain is noted terminating in the Morison's pouch with no collection demonstrated around the ASHLEY drain  There is fluid noted within the gallbladder fossa with hypointense foci that these may represent combination of surgical clips, air Follow-up CT may be considered as needed  I personally discussed this study with ST EDIN TERRELL on 4/8/2021 at 3:31 PM   Workstation performed: [id number]     Ct Abdomen Pelvis With Contrast  Result Date: 4/6/2021  Impression: Findings in the gallbladder suggestive of cholecystitis  Consider ultrasound for further evaluation  Workstation performed: [id number]       Incidental Findings:   · none    Test Results Pending at Discharge (will require follow up):   · none     Outpatient Tests Requested:  · none    Complications:  none    Reason for Admission:     Hospital Course:     Solo Reddy is a 45 y o  female patient who originally presented to the hospital on 4/6/2021 due to Vomiting (patient reports "I think I'm having a gall attack"  she reports pain vomiting and diarrhea beginning this morning  )    Patient known biliary colic notes from a prior HIDA scan on 3/17/20  She was due for an elective cholecystectomy, but this was delayed due to Covid considerations   CT abdomen and pelvis during this admission showed gallbladder wall thickening with pericholecystic fluid  She is also having ongoing pain, nausea, and vomiting, prompting inpatient cholecystectomy  This was performed on 04/07/2021  Patient had a     Please see above list of diagnoses and related plan for additional information  Condition at Discharge: good     Discharge Day Visit / Exam:   Subjective:  Patient is feeling well today  Notes some abdominal soreness  No BM but did have   Vitals: Blood Pressure: 122/58 (04/08/21 1501)  Pulse: 87 (04/08/21 1501)  Temperature: 98 4 °F (36 9 °C) (04/08/21 1501)  Temp Source: Oral (04/08/21 9795)  Respirations: 17 (04/08/21 1501)  Height: 5' 2" (157 5 cm) (04/06/21 1259)  Weight - Scale: 91 5 kg (201 lb 11 5 oz) (04/06/21 1259)  SpO2: 95 % (04/08/21 1501)  Exam:   Physical Exam  Vitals signs and nursing note reviewed  Constitutional:       General: She is not in acute distress  Appearance: She is not ill-appearing  HENT:      Head: Normocephalic  Cardiovascular:      Rate and Rhythm: Normal rate and regular rhythm  Heart sounds: No murmur  Pulmonary:      Effort: Pulmonary effort is normal       Breath sounds: Normal breath sounds  Abdominal:      General: There is no distension  Comments: ASHLEY drain in place RUQ draining bloody fluid  Skin:     General: Skin is warm and dry  Neurological:      Mental Status: She is alert and oriented to person, place, and time  Psychiatric:         Mood and Affect: Mood normal          Discharge instructions/Information to patient and family:   See after visit summary for information provided to patient and family  Provisions for Follow-Up Care:  See after visit summary for information related to follow-up care and any pertinent home health orders  Disposition:   Home      Planned Readmission: no     Discharge Statement:  I spent 45 minutes discharging the patient  This time was spent on the day of discharge   I had direct contact with the patient on the day of discharge  Greater than 50% of the total time was spent examining patient, answering all patient questions, arranging and discussing plan of care with patient as well as directly providing post-discharge instructions  Additional time then spent on discharge activities  Discharge Medications:  See after visit summary for reconciled discharge medications provided to patient and family        ** Please Note: This note has been constructed using a voice recognition system **

## 2021-04-08 NOTE — ASSESSMENT & PLAN NOTE
Patient with epigastric / RUQ pain and nausea/vomiting  RUQ Ultrasound without acute pathology  However, Previous HIDA revealed biliary dyskinesia  General surgery consultation appreciated- plan for laparoscopic cholecystectomy 3/1/91 without complication  Will be discharged home with ASHLEY drain in place  Plan for postoperative appointment on 4/12/21  PRN pain control with oxycodone and can alternate with PO tylenol and ibuprofen if needed  Received Ancef and Flagyl while here  No need for further antibiotics on discharge

## 2021-04-09 ENCOUNTER — TELEPHONE (OUTPATIENT)
Dept: SURGERY | Facility: CLINIC | Age: 41
End: 2021-04-09

## 2021-04-09 NOTE — TELEPHONE ENCOUNTER
Patient Neri Paredes contacted the office  She stated she had her gallbladder done a few days ago and was sent with a drain  She is stating the gauze is getting soiled and wants to know should she take it off and replace? Or should she wait until her appt Monday  Sent Tigertext to Dr Bert Opitz  He will be contacting her

## 2021-04-09 NOTE — CASE MANAGEMENT
Late entry:Pt was dc'd home on 4/8/21 and has an outpatient follow up appointment scheduled with Dr Filiberto Rosa (General Surgery)

## 2021-04-12 ENCOUNTER — OFFICE VISIT (OUTPATIENT)
Dept: SURGERY | Facility: CLINIC | Age: 41
End: 2021-04-12

## 2021-04-12 VITALS
HEIGHT: 62 IN | HEART RATE: 86 BPM | TEMPERATURE: 98.5 F | SYSTOLIC BLOOD PRESSURE: 138 MMHG | BODY MASS INDEX: 36.62 KG/M2 | DIASTOLIC BLOOD PRESSURE: 80 MMHG | WEIGHT: 199 LBS

## 2021-04-12 DIAGNOSIS — K80.50 BILIARY COLIC: Primary | ICD-10-CM

## 2021-04-12 PROCEDURE — 99024 POSTOP FOLLOW-UP VISIT: CPT | Performed by: SURGERY

## 2021-04-12 NOTE — ASSESSMENT & PLAN NOTE
Status post laparoscopic cholecystectomy  Doing well  The drain was placed due to complexity of the gallbladder surgery  Minimal output daily of serous fluid  Drain removed in the office  Follow-up in 2 weeks for wound check and to discuss pathology

## 2021-04-12 NOTE — PROGRESS NOTES
Assessment/Plan:    Biliary colic  Status post laparoscopic cholecystectomy  Doing well  The drain was placed due to complexity of the gallbladder surgery  Minimal output daily of serous fluid  Drain removed in the office  Follow-up in 2 weeks for wound check and to discuss pathology  Diagnoses and all orders for this visit:    Biliary colic          Subjective:      Patient ID: Tree Lopez is a 45 y o  female  23-year-old female status post laparoscopic cholecystectomy with drain placement presents today for follow-up and drain removal   Doing well  No nausea vomiting  No fevers or chills  Mild abdominal soreness  Tolerating diet  No changes in bowel bladder habits  Drain output has been clear to somewhat punch colored  Output continues to try to treat decrease his approximately 10 15 cc per day      The following portions of the patient's history were reviewed and updated as appropriate:   She  has a past medical history of PVC (premature ventricular contraction)  She   Patient Active Problem List    Diagnosis Date Noted    Transaminitis 64/15/7569    Biliary colic     Enterocolitis 2020    Sepsis, unspecified organism ([facility] [address] ) 2020    Chest pain 2018    Chronic low back pain 2018    Leukocytosis 2018     She  has a past surgical history that includes  section; [place] tooth extraction; Anterior cruciate ligament repair (Left); CHOLECYSTECTOMY LAPAROSCOPIC (N/A, 2021); and [place] cholangiogram t tube (2021)  Her family history is not on file  She  reports that she has never smoked  She has never used smokeless tobacco  She reports current alcohol use of about 1 0 standard drinks of alcohol per week  She reports that she does not use drugs    Current Outpatient Medications   Medication Sig Dispense Refill    acetaminophen (TYLENOL) 325 mg tablet Take 2 tablets (650 mg total) by mouth every 6 (six) hours as needed for mild pain 30 tablet 0    b complex vitamins tablet Take 1 tablet by mouth daily      Cholecalciferol (VITAMIN D PO) Take 5,000 Units by mouth daily       lansoprazole (PREVACID) 15 mg capsule Take 30 mg by mouth daily       oxyCODONE (ROXICODONE) 5 mg immediate release tablet Take 1 tablet (5 mg total) by mouth every 6 (six) hours as needed for severe pain for up to 8 dosesMax Daily Amount: 20 mg 8 tablet 0     No current facility-administered medications for this visit  She has No Known Allergies       Review of Systems   Constitutional: Negative for activity change, appetite change, chills, diaphoresis, fatigue, fever and unexpected weight change  Gastrointestinal: Positive for abdominal pain  Objective:      /80   Pulse 86   Temp 98 5 °F (36 9 °C)   Ht 5' 2" (1 575 m)   Wt 90 3 kg (199 lb)   BMI 36 40 kg/m²          Physical Exam  Vitals signs reviewed  Constitutional:       General: She is not in acute distress  Appearance: Normal appearance  She is not ill-appearing or diaphoretic  Abdominal:      General: There is no distension  Palpations: There is no mass  Tenderness: There is no abdominal tenderness  There is no guarding  Hernia: No hernia is present  Comments:  Right lower quadrant drain in place with serous fluid  Remaining 3 port sites clean dry intact  Neurological:      Mental Status: She is alert

## 2021-04-26 ENCOUNTER — OFFICE VISIT (OUTPATIENT)
Dept: SURGERY | Facility: CLINIC | Age: 41
End: 2021-04-26

## 2021-04-26 VITALS
WEIGHT: 202 LBS | HEART RATE: 88 BPM | SYSTOLIC BLOOD PRESSURE: 126 MMHG | TEMPERATURE: 98.5 F | BODY MASS INDEX: 37.17 KG/M2 | HEIGHT: 62 IN | DIASTOLIC BLOOD PRESSURE: 72 MMHG

## 2021-04-26 DIAGNOSIS — K80.50 BILIARY COLIC: Primary | ICD-10-CM

## 2021-04-26 PROCEDURE — 99024 POSTOP FOLLOW-UP VISIT: CPT | Performed by: SURGERY

## 2021-04-26 NOTE — PROGRESS NOTES
Assessment/Plan:    Biliary colic   Status post laparoscopic cholecystectomy  Doing very well  Some scant drainage the umbilical site but otherwise no evidence of infection  Remainder incisions healed well  Tolerating diet  Pathology reviewed discussed the patient  Follow-up leigh ann Villarreal Diagnoses and all orders for this visit:    Biliary colic          Subjective:      Patient ID: Robert Butler is a 45 y o  female  71-year-old female status post laparoscopic cholecystectomy presents today for follow-up  Overall doing well  No nausea vomiting  No fevers or chills  Tolerating diet  Patient states that her umbilical incision is a little weepy but otherwise healing well  The following portions of the patient's history were reviewed and updated as appropriate:   She  has a past medical history of PVC (premature ventricular contraction)  She   Patient Active Problem List    Diagnosis Date Noted    Transaminitis     Biliary colic 8055    Enterocolitis 2020    Sepsis, unspecified organism ([facility] [address] ) 2020    Chest pain 2018    Chronic low back pain 2018    Leukocytosis 2018     She  has a past surgical history that includes  section; [place] tooth extraction; Anterior cruciate ligament repair (Left); CHOLECYSTECTOMY LAPAROSCOPIC (N/A, 2021); and [place] cholangiogram t tube (2021)  Her family history is not on file  She  reports that she has never smoked  She has never used smokeless tobacco  She reports current alcohol use of about 1 0 standard drinks of alcohol per week  She reports that she does not use drugs    Current Outpatient Medications   Medication Sig Dispense Refill    acetaminophen (TYLENOL) 325 mg tablet Take 2 tablets (650 mg total) by mouth every 6 (six) hours as needed for mild pain 30 tablet 0    b complex vitamins tablet Take 1 tablet by mouth daily      Cholecalciferol (VITAMIN D PO) Take 5,000 Units by mouth daily       lansoprazole (PREVACID) 15 mg capsule Take 30 mg by mouth daily       oxyCODONE (ROXICODONE) 5 mg immediate release tablet Take 1 tablet (5 mg total) by mouth every 6 (six) hours as needed for severe pain for up to 8 dosesMax Daily Amount: 20 mg 8 tablet 0     No current facility-administered medications for this visit  She has No Known Allergies       Review of Systems   Constitutional: Negative  Gastrointestinal: Negative for abdominal pain, constipation, rectal pain and vomiting  Skin: Positive for wound (  Umbilical)  Negative for color change, pallor and rash  Objective:      /72   Pulse 88   Temp 98 5 °F (36 9 °C)   Ht 5' 2" (1 575 m)   Wt 91 6 kg (202 lb)   BMI 36 95 kg/m²          Physical Exam  Vitals signs reviewed  Constitutional:       General: She is not in acute distress  Appearance: Normal appearance  She is not ill-appearing, toxic-appearing or diaphoretic  Abdominal:      General: There is no distension  Palpations: There is no mass  Tenderness: There is no abdominal tenderness  Hernia: No hernia is present  Comments: For subcostal epigastric incisions healed well  The supraumbilical incision with says 1 mm dehiscence mild erythema  Scant serous drainage  No evidence of infection  Neurological:      Mental Status: She is alert

## 2021-04-26 NOTE — ASSESSMENT & PLAN NOTE
Status post laparoscopic cholecystectomy  Doing very well  Some scant drainage the umbilical site but otherwise no evidence of infection  Remainder incisions healed well  Tolerating diet  Pathology reviewed discussed the patient  Follow-up leigh ann Tinsley

## 2021-05-17 ENCOUNTER — OFFICE VISIT (OUTPATIENT)
Dept: SURGERY | Facility: CLINIC | Age: 41
End: 2021-05-17

## 2021-05-17 VITALS
HEIGHT: 62 IN | DIASTOLIC BLOOD PRESSURE: 83 MMHG | TEMPERATURE: 98.2 F | BODY MASS INDEX: 37.17 KG/M2 | SYSTOLIC BLOOD PRESSURE: 132 MMHG | WEIGHT: 202 LBS | HEART RATE: 109 BPM

## 2021-05-17 DIAGNOSIS — K80.50 BILIARY COLIC: Primary | ICD-10-CM

## 2021-05-17 PROCEDURE — 99024 POSTOP FOLLOW-UP VISIT: CPT | Performed by: SURGERY

## 2021-05-17 NOTE — ASSESSMENT & PLAN NOTE
Status post laparoscopic cholecystectomy on 4/7  [place] a lump and pain at umbilical incision  No drainage or erythema  Slowly improving  Tolerating diet, afebrile, and otherwise feels well  May slowly increase activity  Follow-up prn

## 2021-05-17 NOTE — PROGRESS NOTES
Assessment/Plan:    Biliary colic  Status post laparoscopic cholecystectomy on   Clarence a lump and pain at umbilical incision  No drainage or erythema  Slowly improving  Tolerating diet, afebrile, and otherwise feels well  May slowly increase activity  Follow-up prn  Diagnoses and all orders for this visit:    Biliary colic          Subjective:      Patient ID: Kaelyn Thomas is a 45 y o  female  Status post laparoscopic cholecystectomy      The following portions of the patient's history were reviewed and updated as appropriate:   She  has a past medical history of PVC (premature ventricular contraction)  She   Patient Active Problem List    Diagnosis Date Noted    Transaminitis     Biliary colic     Enterocolitis 2020    Sepsis, unspecified organism ([facility] [address] ) 2020    Chest pain 2018    Chronic low back pain 2018    Leukocytosis 2018     She  has a past surgical history that includes  section; [place] tooth extraction; Anterior cruciate ligament repair (Left); CHOLECYSTECTOMY LAPAROSCOPIC (N/A, 2021); and [place] cholangiogram t tube (2021)  Her family history is not on file  She  reports that she has never smoked  She has never used smokeless tobacco  She reports current alcohol use of about 1 0 standard drinks of alcohol per week  She reports that she does not use drugs    Current Outpatient Medications   Medication Sig Dispense Refill    acetaminophen (TYLENOL) 325 mg tablet Take 2 tablets (650 mg total) by mouth every 6 (six) hours as needed for mild pain 30 tablet 0    b complex vitamins tablet Take 1 tablet by mouth daily      Cholecalciferol (VITAMIN D PO) Take 5,000 Units by mouth daily       lansoprazole (PREVACID) 15 mg capsule Take 30 mg by mouth daily       oxyCODONE (ROXICODONE) 5 mg immediate release tablet Take 1 tablet (5 mg total) by mouth every 6 (six) hours as needed for severe pain for up to 8 dosesMax Daily Amount: 20 mg 8 tablet 0     No current facility-administered medications for this visit       Review of Systems   Constitutional: Negative for activity change, appetite change, chills and fever  HENT: Negative  Eyes: Negative  Respiratory: Negative  Cardiovascular: Negative  Gastrointestinal: Positive for abdominal pain  Negative for blood in stool, constipation, diarrhea, nausea and vomiting  Endocrine: Negative  Musculoskeletal: Negative  Skin: Negative  Allergic/Immunologic: Negative  Neurological: Negative  Hematological: Negative  Psychiatric/Behavioral: Negative  Objective:      /83   Pulse (!) 109   Temp 98 2 °F (36 8 °C)   Ht 5' 2" (1 575 m)   Wt 91 6 kg (202 lb)   BMI 36 95 kg/m²          Physical Exam  Constitutional:       General: She is not in acute distress  Appearance: She is not toxic-appearing  HENT:      Head: Normocephalic  Mouth/Throat:      Mouth: Mucous membranes are dry  Eyes:      Pupils: Pupils are equal, round, and reactive to light  Neck:      Musculoskeletal: Normal range of motion  Cardiovascular:      Rate and Rhythm: Normal rate  Pulmonary:      Effort: Pulmonary effort is normal    Chest:      Chest wall: No tenderness  Abdominal:      Comments: Soft, non-distended, non-tender  Incisions c/d/i  Mild swelling at umbilical incision, no erythema, drainage, or palpable hernia   Skin:     General: Skin is warm and dry  Capillary Refill: Capillary refill takes less than 2 seconds  Neurological:      General: No focal deficit present  Mental Status: She is alert and oriented to person, place, and time     Psychiatric:         Mood and Affect: Mood normal

## 2021-05-21 ENCOUNTER — TELEPHONE (OUTPATIENT)
Dept: SURGERY | Facility: CLINIC | Age: 41
End: 2021-05-21

## 2021-08-23 ENCOUNTER — NURSE TRIAGE (OUTPATIENT)
Dept: OTHER | Facility: OTHER | Age: 41
End: 2021-08-23

## 2021-08-23 DIAGNOSIS — Z20.828 SARS-ASSOCIATED CORONAVIRUS EXPOSURE: Primary | ICD-10-CM

## 2021-08-23 NOTE — TELEPHONE ENCOUNTER
Regarding: symptomatic - covid test- exposure/ diarrhea, congestion  ----- Message from Pascal Heimlich sent at 8/23/2021  1:37 PM EDT -----  "My  tested positive    I have loose stools, congestion, headaches, and a scratchy throat "

## 2021-08-23 NOTE — TELEPHONE ENCOUNTER
Symptomatic  Onset 8/21/21  Cough, diarrhea, dizziness, headache, congestion  Sore throat  Denies SOB  Denies Chest Pain  Swab order placed  [facility] location and contact number given along with instructions for swab  Care advice given  Informed to call back if worsening symptoms  Verbalized understanding

## 2021-08-23 NOTE — TELEPHONE ENCOUNTER
Reason for Disposition   [1] COVID-19 infection suspected by caller or triager AND [2] mild symptoms (cough, fever, or others) AND [5] no complications or SOB    Answer Assessment - Initial Assessment Questions  Were you within 6 feet or less, for up to 15 minutes or more with a person that has a confirmed COVID-19 test?      postive  What was the date of your exposure? Constant exposure  Home environment       Are you experiencing any symptoms attributed to the virus?  (Assess for SOB, cough, fever, difficulty breathing)            Onset 8/21/21  Cough, diarrhea, dizziness, headache, congestion  Sore throat  HIGH RISK: Do you have any history heart or lung conditions, weakened immune system, diabetes, Asthma, CHF, HIV, COPD, Chemo, renal failure, sickle cell, etc?            Denies       PREGNANCY: Are you pregnant or did you recently give birth? Denies   LMP: 822/21    Protocols used: CORONAVIRUS (COVID-19) [facility]NOSED OR [facility]PECT[facility]

## 2021-08-24 PROCEDURE — U0005 INFEC AGEN DETEC AMPLI PROBE: HCPCS | Performed by: FAMILY MEDICINE

## 2021-08-24 PROCEDURE — U0003 INFECTIOUS AGENT DETECTION BY NUCLEIC ACID (DNA OR RNA); SEVERE ACUTE RESPIRATORY SYNDROME CORONAVIRUS 2 (SARS-COV-2) (CORONAVIRUS DISEASE [COVID-19]), AMPLIFIED PROBE TECHNIQUE, MAKING USE OF HIGH THROUGHPUT TECHNOLOGIES AS DESCRIBED BY CMS-2020-01-R: HCPCS | Performed by: FAMILY MEDICINE

## 2022-12-13 ENCOUNTER — OFFICE VISIT (OUTPATIENT)
Dept: OBGYN CLINIC | Facility: CLINIC | Age: 42
End: 2022-12-13

## 2022-12-13 VITALS
HEIGHT: 62 IN | DIASTOLIC BLOOD PRESSURE: 82 MMHG | WEIGHT: 193 LBS | SYSTOLIC BLOOD PRESSURE: 124 MMHG | BODY MASS INDEX: 35.51 KG/M2

## 2022-12-13 DIAGNOSIS — N63.10 MASS OF RIGHT BREAST, UNSPECIFIED QUADRANT: ICD-10-CM

## 2022-12-13 DIAGNOSIS — Z01.419 WELL WOMAN EXAM WITH ROUTINE GYNECOLOGICAL EXAM: Primary | ICD-10-CM

## 2022-12-13 DIAGNOSIS — Z12.31 BREAST CANCER SCREENING BY MAMMOGRAM: ICD-10-CM

## 2022-12-13 NOTE — PROGRESS NOTES
Subjective      Coco Sung is a 40 y o  female who presents for annual well woman exam   Last Pap smear about 3 years ago  Has not had a mammogram  Periods are regular every 28-30 days, lasting 7 days  No intermenstrual bleeding, spotting, or discharge  Complains of right breast lump for 6-month  Since onset the lump has gotten slightly larger  Associated symptoms pain last month  Denies skin changes or nipple discharge  Denies alleviating or aggravating factors  Family history of breast cancer maternal grandmother  Current contraception: vasectomy  History of abnormal Pap smear: no  Family history of uterine or ovarian cancer: mother uterine cancer   Regular self breast exam: yes  History of abnormal mammogram: due for mammogram   Family history of breast cancer:M  Grandmother   History of abnormal lipids: no  Gardasil vaccine:  No     Menstrual History:  OB History        1    Para   1    Term   1            AB        Living   1       SAB        IAB        Ectopic        Multiple        Live Births   1                Menarche age: 7  Patient's last menstrual period was 2022 (exact date)  Period Cycle (Days):  (monthly)  Period Duration (Days): 7  Period Pattern: Regular  Menstrual Flow: Heavy, Moderate, Light (heavy for 3 days)  Menstrual Control Change Freq (Hours): 5-7 times a day  Dysmenorrhea: (!) Moderate (heat, IBU, midol)  Dysmenorrhea Symptoms: Cramping    The following portions of the patient's history were reviewed and updated as appropriate: allergies, current medications, past family history, past medical history, past social history, past surgical history and problem list     Review of Systems  Pertinent items are noted in HPI        Objective      /82   Ht 5' 2" (1 575 m)   Wt 87 5 kg (193 lb)   LMP 2022 (Exact Date)   BMI 35 30 kg/m²     General:   alert and oriented, in no acute distress, alert, moderately obese, appears stated age and cooperative   Heart: regular rate and rhythm, S1, S2 normal, no murmur, click, rub or gallop   Lungs: clear to auscultation bilaterally   Abdomen: soft, non-tender, without masses or organomegaly   Vulva: normal, Bartholin's, Urethra, [place]'s normal   Vagina: normal mucosa, normal discharge, no palpable nodules   Cervix: no bleeding following Pap, no cervical motion tenderness and no lesions   Uterus: normal size, non-tender, normal shape and consistency   Adnexa: normal adnexa and no mass, fullness, tenderness   Breast: Right breast @ 10-11 o'clock < 1cm mobile round mass breasts appear normal,  no skin or nipple changes or axillary nodes  No masses in left breast              Assessment      @well woman@   Plan      All questions answered  Await pap smear results  Breast self exam technique reviewed and patient encouraged to perform self-exam monthly  Contraception: vasectomy  Diagnosis explained in detail, including differential   Discussed healthy lifestyle modifications  Educational material distributed  Follow up in 1 year  Follow up as needed  Mammogram   Sono-mammogram   Thin prep Pap smear  Breast awareness reviewed    Bilateral diagnostic mammogram and right breast ultrasound ordered  We will call/TopPatcht message with results  Encouraged healthy diet, exercise and lifestyle  Encouraged follow-up with PCP as needed  Encouraged seasonal influenza vaccination  Gardasil vaccine series reviewed  Written information provided  Encouraged social distancing, good hand hygiene, avoidance of crowds and masking  Written information provided about COVID-19    Will call/ Catapultt message  with results  VBI-    BMI Counseling: Body mass index is 35 3 kg/m²  The BMI is above normal  Nutrition recommendations include reducing portion sizes, decreasing overall calorie intake and 3-5 servings of fruits/vegetables daily   Exercise recommendations include exercising 3-5 times per week, joining a gym and strength training exercises

## 2022-12-15 LAB
HPV HR 12 DNA CVX QL NAA+PROBE: NEGATIVE
HPV16 DNA CVX QL NAA+PROBE: NEGATIVE
HPV18 DNA CVX QL NAA+PROBE: NEGATIVE

## 2022-12-21 LAB
LAB AP GYN PRIMARY INTERPRETATION: NORMAL
LAB AP LMP: NORMAL
Lab: NORMAL

## 2023-01-12 ENCOUNTER — HOSPITAL ENCOUNTER (OUTPATIENT)
Dept: ULTRASOUND IMAGING | Facility: HOSPITAL | Age: 43
Discharge: HOME | End: 2023-01-12

## 2023-01-12 ENCOUNTER — HOSPITAL ENCOUNTER (OUTPATIENT)
Dept: MAMMOGRAPHY | Facility: HOSPITAL | Age: 43
Discharge: HOME | End: 2023-01-12

## 2023-01-12 VITALS — HEIGHT: 62 IN | WEIGHT: 193 LBS | BODY MASS INDEX: 35.51 KG/M2

## 2023-01-12 DIAGNOSIS — Z12.31 BREAST CANCER SCREENING BY MAMMOGRAM: ICD-10-CM

## 2023-01-12 DIAGNOSIS — N63.10 MASS OF RIGHT BREAST, UNSPECIFIED QUADRANT: ICD-10-CM

## 2023-04-24 ENCOUNTER — OFFICE VISIT (OUTPATIENT)
Dept: URGENT CARE | Facility: MEDICAL CENTER | Age: 43
End: 2023-04-24

## 2023-04-24 VITALS
HEART RATE: 111 BPM | TEMPERATURE: 99.1 F | RESPIRATION RATE: 18 BRPM | DIASTOLIC BLOOD PRESSURE: 78 MMHG | SYSTOLIC BLOOD PRESSURE: 116 MMHG | OXYGEN SATURATION: 97 %

## 2023-04-24 DIAGNOSIS — J02.9 SORE THROAT: ICD-10-CM

## 2023-04-24 DIAGNOSIS — J02.0 STREP PHARYNGITIS: Primary | ICD-10-CM

## 2023-04-24 LAB — S PYO AG THROAT QL: POSITIVE

## 2023-04-24 RX ORDER — AMOXICILLIN 500 MG/1
500 CAPSULE ORAL EVERY 12 HOURS SCHEDULED
Qty: 20 CAPSULE | Refills: 0 | Status: SHIPPED | OUTPATIENT
Start: 2023-04-24 | End: 2023-05-04

## 2023-04-24 NOTE — PATIENT INSTRUCTIONS
You may take over the counter Tylenol (Acetaminophen) and/or Motrin (Ibuprofen) as needed, as directed on packaging  Be sure to get plenty of rest, and drinking fluids to remain hydrated  Strep throat is contagious  It's spread through droplets such as when you sneeze pozo cough  It is also shared through shared food and drinks  You should avoid sharing any cups, utensils, drinks/food etc  It can also be picked up from surfaces which have been touched if someone sneezes into their hand then touches the surface  Once starting antibiotics strep throat usually is no longer contagious after 24-48 hours  You should replace your toothbrush after strep throat  I recommend you replace it after 2-3 days of antibiotic use  Be sure to treat your symptoms to help you feel better faster  Making sure you get plenty of fluids, and treating any fevers with normal over the counter medications  You may also try over the counter throat spray or throat lozenges if appropriate to help sooth the throat  (Throat lozenges are not recommended in young children as they can be a choking hazard)

## 2023-04-24 NOTE — PROGRESS NOTES
[facility]        NAME: Ronen Maciel is a 40 y o  female  : 1980    MRN: [id number]  DATE: 2023  TIME: 8:23 AM    Assessment and Plan   Strep pharyngitis [J02 0]  1  Strep pharyngitis  amoxicillin (AMOXIL) 500 mg capsule      2  Sore throat  POCT rapid strepA            Patient Instructions       Follow up with PCP in 3-5 days  Proceed to  ER if symptoms worsen  Chief Complaint     Chief Complaint   Patient presents with   • Sore Throat     Fever, congestion, dizziness, started yesterday         History of Present Illness       Fever, sore throat, congestion since yesterday  TMAX 102 3, taking tylenol- last dose around 4am  Patient eating and drinking normal  No ill contacts that she is aware of  Review of Systems   Review of Systems   Constitutional: Positive for appetite change, chills and fever  HENT: Positive for congestion, postnasal drip, rhinorrhea, sinus pressure, sore throat and trouble swallowing  Negative for ear pain  Respiratory: Negative for cough and shortness of breath  Cardiovascular: Negative for chest pain and palpitations  Gastrointestinal: Negative for abdominal pain, constipation, diarrhea, nausea and vomiting  Genitourinary: Negative for dysuria and hematuria  Musculoskeletal: Negative for arthralgias and back pain  Skin: Negative for color change and rash  Neurological: Positive for dizziness, light-headedness and headaches  Negative for seizures and syncope  All other systems reviewed and are negative          Current Medications       Current Outpatient Medications:   •  acetaminophen (TYLENOL) 325 mg tablet, Take 2 tablets (650 mg total) by mouth every 6 (six) hours as needed for mild pain, Disp: 30 tablet, Rfl: 0  •  amoxicillin (AMOXIL) 500 mg capsule, Take 1 capsule (500 mg total) by mouth every 12 (twelve) hours for 10 days, Disp: 20 capsule, Rfl: 0  •  Cholecalciferol (VITAMIN D PO), Take 5,000 Units by mouth daily , Disp: , Rfl:   •  lansoprazole (PREVACID) 15 mg capsule, Take 30 mg by mouth daily , Disp: , Rfl:     Current Allergies     Allergies as of 2023   • (No Known Allergies)            The following portions of the patient's history were reviewed and updated as appropriate: allergies, current medications, past family history, past medical history, past social history, past surgical history and problem list      Past Medical History:   Diagnosis Date   • Endometriosis     They found a lesion and removed it   • Female infertility    • Genital warts 2016    Have been treated and removed   • Polycystic ovary syndrome    • PVC (premature ventricular contraction)    • Varicella ? Past Surgical History:   Procedure Laterality Date   • ANTERIOR CRUCIATE LIGAMENT REPAIR Left    •  SECTION     • CHOLECYSTECTOMY     • CHOLECYSTECTOMY LAPAROSCOPIC N/A 2021    Procedure: CHOLECYSTECTOMY LAPAROSCOPIC, IOC;  Surgeon: Garett Perkins DO;  Location: [facility];  Service: General   • CONDYLOMA EXCISION/FULGURATION  [id number]   • [place] CHOLANGIOGRAM T TUBE  2021   • WISDOM TOOTH EXTRACTION         Family History   Problem Relation Age of Onset   • BRCA2 Negative Mother    • BRCA1 Negative Mother    • Thyroid disease Mother    • Uterine cancer Mother    • Heart disease Father    • Diabetes Father    • Heart attack Father    • Skin cancer Father    • No Known Problems Daughter    • Breast cancer Maternal Grandmother 95   • Diabetes Maternal Grandfather    • Heart attack Paternal Grandfather    • No Known Problems Brother    • Other Brother         uncertain   • Early death Brother    • Cervical cancer Paternal Aunt    • Colon cancer Neg Hx    • Ovarian cancer Neg Hx          Medications have been verified  Objective   /78   Pulse (!) 111   Temp 99 1 °F (37 3 °C)   Resp 18   SpO2 97%        Physical Exam     Physical Exam  Vitals and nursing note reviewed     Constitutional: General: She is not in acute distress  Appearance: Normal appearance  She is normal weight  She is not ill-appearing  HENT:      Head: Normocephalic and atraumatic  Right Ear: Tympanic membrane, ear canal and external ear normal       Left Ear: Tympanic membrane, ear canal and external ear normal       Nose: Congestion and rhinorrhea present  Rhinorrhea is clear  Mouth/Throat:      Lips: Pink  Mouth: Mucous membranes are moist       Pharynx: Uvula midline  Pharyngeal swelling and posterior oropharyngeal erythema present  No oropharyngeal exudate or uvula swelling  Tonsils: No tonsillar exudate or tonsillar abscesses  1+ on the right  1+ on the left  Eyes:      Extraocular Movements: Extraocular movements intact  Conjunctiva/sclera: Conjunctivae normal       Pupils: Pupils are equal, round, and reactive to light  Cardiovascular:      Rate and Rhythm: Normal rate and regular rhythm  Pulses: Normal pulses  Heart sounds: Normal heart sounds  Pulmonary:      Effort: Pulmonary effort is normal       Breath sounds: Normal breath sounds  Abdominal:      General: Abdomen is flat  Bowel sounds are normal       Palpations: Abdomen is soft  Musculoskeletal:         General: Normal range of motion  Cervical back: Full passive range of motion without pain, normal range of motion and neck supple  Lymphadenopathy:      Cervical: No cervical adenopathy  Skin:     General: Skin is warm  Capillary Refill: Capillary refill takes less than 2 seconds  Neurological:      General: No focal deficit present  Mental Status: She is alert and oriented to person, place, and time     Psychiatric:         Mood and Affect: Mood normal          Behavior: Behavior normal

## 2023-09-12 ENCOUNTER — OFFICE VISIT (OUTPATIENT)
Dept: URGENT CARE | Facility: MEDICAL CENTER | Age: 43
End: 2023-09-12
Payer: COMMERCIAL

## 2023-09-12 VITALS
OXYGEN SATURATION: 98 % | WEIGHT: 212 LBS | TEMPERATURE: 97.7 F | HEART RATE: 89 BPM | SYSTOLIC BLOOD PRESSURE: 122 MMHG | BODY MASS INDEX: 39.01 KG/M2 | HEIGHT: 62 IN | RESPIRATION RATE: 16 BRPM | DIASTOLIC BLOOD PRESSURE: 82 MMHG

## 2023-09-12 DIAGNOSIS — J20.8 ACUTE BACTERIAL BRONCHITIS: Primary | ICD-10-CM

## 2023-09-12 DIAGNOSIS — B96.89 ACUTE BACTERIAL BRONCHITIS: Primary | ICD-10-CM

## 2023-09-12 DIAGNOSIS — R05.1 ACUTE COUGH: ICD-10-CM

## 2023-09-12 LAB
SARS-COV-2 AG UPPER RESP QL IA: NEGATIVE
VALID CONTROL: NORMAL

## 2023-09-12 PROCEDURE — 99212 OFFICE O/P EST SF 10 MIN: CPT | Performed by: PHYSICIAN ASSISTANT

## 2023-09-12 PROCEDURE — 87811 SARS-COV-2 COVID19 W/OPTIC: CPT | Performed by: PHYSICIAN ASSISTANT

## 2023-09-12 RX ORDER — AZITHROMYCIN 250 MG/1
TABLET, FILM COATED ORAL
Qty: 6 TABLET | Refills: 0 | Status: SHIPPED | OUTPATIENT
Start: 2023-09-12 | End: 2023-09-16

## 2023-09-12 RX ORDER — ALBUTEROL SULFATE 90 UG/1
2 AEROSOL, METERED RESPIRATORY (INHALATION) EVERY 4 HOURS PRN
Qty: 18 G | Refills: 0 | Status: SHIPPED | OUTPATIENT
Start: 2023-09-12 | End: 2023-10-12

## 2023-09-12 NOTE — LETTER
September 12, 2023     Patient: Kasey Panda   YOB: 1980   Date of Visit: 9/12/2023       To Whom It May Concern: It is my medical opinion that Kasey Panda should not attend work 09/12/23 due to illness. If you have any questions or concerns, please don't hesitate to call.          Sincerely,        Johana Machado PA-C    CC: No Recipients

## 2023-09-12 NOTE — PATIENT INSTRUCTIONS
Start Zithromax  Albuterol as needed for cough or shortness of breath  Plenty of fluids  If symptoms worsen have your self rechecked

## 2023-09-12 NOTE — PROGRESS NOTES
[facility]        NAME: Pranay Burnett is a 40 y.o. female  : 1980    MRN: [id number]  DATE: 2023  TIME: 9:42 AM    Assessment and Plan   Acute bacterial bronchitis [J20.8, B96.89]  1. Acute bacterial bronchitis  azithromycin (ZITHROMAX) 250 mg tablet    albuterol (PROVENTIL HFA,VENTOLIN HFA) 90 mcg/act inhaler      2. Acute cough  Poct Covid 19 Rapid Antigen Test            Patient Instructions       Follow up with PCP in 3-5 days. Proceed to  ER if symptoms worsen. Chief Complaint     Chief Complaint   Patient presents with   • Cold Like Symptoms     C/o chest congestion, productive cough since Friday. Afebrile. Reports 3-4 episodes of diarrhea since , no blood in stool. Cough worse at night., reports mild sob. SPO2 98%. Has taken otc dayquil/nightquil with some relief. Reports nasal drip. Intermittent loss of voice x2 days. Denies sick contacts, reports hx of bronchitis. History of Present Illness       Presents with a 4-day history of chest congestion productive cough postnasal drip. Denies fevers. No known exposure to covid. Having intermittent shortness of breath and wheezing. No previous history of asthma. Review of Systems   Review of Systems   Constitutional: Negative for chills and fever. HENT: Positive for congestion and voice change. Respiratory: Positive for cough and shortness of breath. Gastrointestinal: Positive for diarrhea.          Current Medications       Current Outpatient Medications:   •  albuterol (PROVENTIL HFA,VENTOLIN HFA) 90 mcg/act inhaler, Inhale 2 puffs every 4 (four) hours as needed for wheezing or shortness of breath, Disp: 18 g, Rfl: 0  •  azithromycin (ZITHROMAX) 250 mg tablet, Take 2 tablets today then 1 tablet daily x 4 days, Disp: 6 tablet, Rfl: 0  •  Cholecalciferol (VITAMIN D PO), Take 5,000 Units by mouth daily , Disp: , Rfl:   •  lansoprazole (PREVACID) 15 mg capsule, Take 30 mg by mouth daily , Disp: , Rfl:   •  acetaminophen (TYLENOL) 325 mg tablet, Take 2 tablets (650 mg total) by mouth every 6 (six) hours as needed for mild pain (Patient not taking: Reported on 2023), Disp: 30 tablet, Rfl: 0    Current Allergies     Allergies as of 2023   • (No Known Allergies)            The following portions of the patient's history were reviewed and updated as appropriate: allergies, current medications, past family history, past medical history, past social history, past surgical history and problem list.     Past Medical History:   Diagnosis Date   • Endometriosis     They found a lesion and removed it   • Female infertility    • Genital warts 2016    Have been treated and removed   • Polycystic ovary syndrome    • PVC (premature ventricular contraction)    • Varicella ? Past Surgical History:   Procedure Laterality Date   • ANTERIOR CRUCIATE LIGAMENT REPAIR Left    •  SECTION     • CHOLECYSTECTOMY     • CHOLECYSTECTOMY LAPAROSCOPIC N/A 2021    Procedure: CHOLECYSTECTOMY LAPAROSCOPIC, IOC;  Surgeon: Leonel Grant DO;  Location: [facility];  Service: General   • CONDYLOMA EXCISION/FULGURATION  [id number]   • [place] CHOLANGIOGRAM T TUBE  2021   • WISDOM TOOTH EXTRACTION         Family History   Problem Relation Age of Onset   • BRCA2 Negative Mother    • BRCA1 Negative Mother    • Thyroid disease Mother    • Uterine cancer Mother    • Heart disease Father    • Diabetes Father    • Heart attack Father    • Skin cancer Father    • No Known Problems Daughter    • Breast cancer Maternal Grandmother 95   • Diabetes Maternal Grandfather    • Heart attack Paternal Grandfather    • No Known Problems Brother    • Other Brother         uncertain   • Early death Brother    • Cervical cancer Paternal Aunt    • Colon cancer Neg Hx    • Ovarian cancer Neg Hx          Medications have been verified.         Objective   /82   Pulse 89   Temp 97.7 °F (36.5 °C)   Resp 16 Ht 5' 2" (1.575 m)   Wt 96.2 kg (212 lb)   SpO2 98%   BMI 38.78 kg/m²   No LMP recorded. Physical Exam     Physical Exam  Vitals and nursing note reviewed. Constitutional:       Appearance: Normal appearance. HENT:      Head: Normocephalic and atraumatic. Right Ear: Tympanic membrane normal.      Left Ear: Tympanic membrane normal.      Mouth/Throat:      Mouth: Mucous membranes are moist.      Pharynx: Oropharynx is clear. Eyes:      Conjunctiva/sclera: Conjunctivae normal.   Cardiovascular:      Rate and Rhythm: Normal rate and regular rhythm. Heart sounds: Normal heart sounds. Pulmonary:      Effort: Pulmonary effort is normal.      Breath sounds: Normal breath sounds. Skin:     General: Skin is warm. Neurological:      Mental Status: She is alert.

## 2023-11-25 ENCOUNTER — OFFICE VISIT (OUTPATIENT)
Dept: URGENT CARE | Facility: MEDICAL CENTER | Age: 43
End: 2023-11-25
Payer: COMMERCIAL

## 2023-11-25 VITALS
TEMPERATURE: 98.6 F | WEIGHT: 215 LBS | HEIGHT: 62 IN | OXYGEN SATURATION: 100 % | HEART RATE: 72 BPM | SYSTOLIC BLOOD PRESSURE: 130 MMHG | BODY MASS INDEX: 39.56 KG/M2 | DIASTOLIC BLOOD PRESSURE: 78 MMHG | RESPIRATION RATE: 18 BRPM

## 2023-11-25 DIAGNOSIS — H10.9 BACTERIAL CONJUNCTIVITIS OF RIGHT EYE: Primary | ICD-10-CM

## 2023-11-25 PROCEDURE — 99213 OFFICE O/P EST LOW 20 MIN: CPT | Performed by: STUDENT IN AN ORGANIZED HEALTH CARE EDUCATION/TRAINING PROGRAM

## 2023-11-25 RX ORDER — POLYMYXIN B SULFATE AND TRIMETHOPRIM 1; 10000 MG/ML; [USP'U]/ML
1 SOLUTION OPHTHALMIC EVERY 4 HOURS
Qty: 10 ML | Refills: 0 | Status: SHIPPED | OUTPATIENT
Start: 2023-11-25 | End: 2023-12-02

## 2023-11-25 RX ORDER — ERYTHROMYCIN 5 MG/G
0.5 OINTMENT OPHTHALMIC
Qty: 7 G | Refills: 0 | Status: SHIPPED | OUTPATIENT
Start: 2023-11-25 | End: 2023-12-02

## 2023-11-25 NOTE — PATIENT INSTRUCTIONS
Conjunctivitis  - clean eyes with wet cloth to remove discharge and debris to prevent superimposed bacterial infection   - wash hands with soap and water before and after cleaning eyes  - avoid eye makeup, contacts.  Replace any eye makeup as this may be contaminated   - apply cool compress  - can use over the counter artificial tears if eyes feel dry  - Avoid close contacts  - return to clinic if worsening discharge, eye pain, visual changes, fever, spots or blood in eyes

## 2023-11-25 NOTE — PROGRESS NOTES
[facility]        NAME: Kala Rodriguez is a 44 y.o. female  : 1980    MRN: [id number]    Assessment and Plan   Bacterial conjunctivitis of right eye [H10.9]  1. Bacterial conjunctivitis of right eye  erythromycin (ILOTYCIN) ophthalmic ointment    polymyxin b-trimethoprim (POLYTRIM) ophthalmic solution        Given acute onset and eyes being glued shut, appears as bacterial etiology. Will tx with topical antibiotics. Discussed supportive measures. Patient Instructions       Follow up with PCP in 3-5 days. Proceed to  ER if symptoms worsen. Chief Complaint     Chief Complaint   Patient presents with    Eye Problem     Right eye draining and irritated started last night          History of Present Illness       HPI    Onset of symptoms yesterday in right eye only. Redness, some blurriness, purulence. No foreign body sensation  Tried cold compress with some relief and was able to sleep. No symptoms on left eye   Reports congestion, rhinorrhea, cough  No contacts or eye makeup    Review of Systems   Review of Systems   Constitutional:  Negative for chills and fever. HENT:  Negative for ear pain and sore throat. Eyes:  Positive for discharge, redness and visual disturbance. Negative for pain. Respiratory:  Negative for cough, chest tightness and shortness of breath. Cardiovascular:  Negative for chest pain and palpitations. Gastrointestinal:  Negative for abdominal pain, constipation, diarrhea, nausea and vomiting. Genitourinary:  Negative for dysuria, hematuria and menstrual problem. Musculoskeletal:  Negative for arthralgias and back pain. Skin:  Negative for color change and rash. Neurological:  Negative for seizures and syncope. Psychiatric/Behavioral:  Negative for dysphoric mood and suicidal ideas. All other systems reviewed and are negative.     Current Medications       Current Outpatient Medications:     acetaminophen (TYLENOL) 325 mg tablet, Take 2 tablets (650 mg total) by mouth every 6 (six) hours as needed for mild pain, Disp: 30 tablet, Rfl: 0    Cholecalciferol (VITAMIN D PO), Take 5,000 Units by mouth daily , Disp: , Rfl:     erythromycin (ILOTYCIN) ophthalmic ointment, Administer 0.5 inches to the right eye daily at bedtime for 7 days, Disp: 7 g, Rfl: 0    lansoprazole (PREVACID) 15 mg capsule, Take 30 mg by mouth daily , Disp: , Rfl:     polymyxin b-trimethoprim (POLYTRIM) ophthalmic solution, Administer 1 drop to the right eye every 4 (four) hours for 7 days Every 4 hours when awake, Disp: 10 mL, Rfl: 0    Current Allergies     Allergies as of 2023    (No Known Allergies)            The following portions of the patient's history were reviewed and updated as appropriate: allergies, current medications, past family history, past medical history, past social history, past surgical history and problem list.     Past Medical History:   Diagnosis Date    Endometriosis     They found a lesion and removed it    Female infertility     Genital warts     Have been treated and removed    Polycystic ovary syndrome     PVC (premature ventricular contraction)     Varicella ?        Past Surgical History:   Procedure Laterality Date    ANTERIOR CRUCIATE LIGAMENT REPAIR Left      SECTION      CHOLECYSTECTOMY      CHOLECYSTECTOMY LAPAROSCOPIC N/A 2021    Procedure: CHOLECYSTECTOMY LAPAROSCOPIC, IOC;  Surgeon: Paul Frank DO;  Location: [facility];  Service: General    CONDYLOMA EXCISION/FULGURATION  [id number]    [place] CHOLANGIOGRAM T TUBE  2021    WISDOM TOOTH EXTRACTION         Family History   Problem Relation Age of Onset    BRCA2 Negative Mother     BRCA1 Negative Mother     Thyroid disease Mother     Uterine cancer Mother     Heart disease Father     Diabetes Father     Heart attack Father     Skin cancer Father     No Known Problems Daughter     Breast cancer Maternal Grandmother 95    Diabetes Maternal Grandfather     Heart attack Paternal Grandfather     No Known Problems Brother     Other Brother         uncertain    Early death Brother     Cervical cancer Paternal Aunt     Colon cancer Neg Hx     Ovarian cancer Neg Hx          Medications have been verified. Objective   /78   Pulse 72   Temp 98.6 °F (37 °C)   Resp 18   Ht 5' 2" (1.575 m)   Wt 97.5 kg (215 lb)   SpO2 100%   BMI 39.32 kg/m²        Physical Exam     Physical Exam  Constitutional:       General: She is not in acute distress. Appearance: Normal appearance. HENT:      Head: Normocephalic and atraumatic. Eyes:      General: Lids are normal.         Right eye: Discharge present. No foreign body or hordeolum. Left eye: No foreign body, discharge or hordeolum. Extraocular Movements: Extraocular movements intact. Right eye: Normal extraocular motion. Left eye: Normal extraocular motion. Conjunctiva/sclera:      Right eye: Right conjunctiva is injected. Chemosis present. No exudate or hemorrhage. Left eye: Left conjunctiva is not injected. No chemosis, exudate or hemorrhage. Pupils: Pupils are equal, round, and reactive to light. Cardiovascular:      Rate and Rhythm: Normal rate. Pulmonary:      Effort: Pulmonary effort is normal. No respiratory distress. Skin:     General: Skin is warm and dry. Neurological:      General: No focal deficit present. Mental Status: She is alert and oriented to person, place, and time.    Psychiatric:         Mood and Affect: Mood normal.         Behavior: Behavior normal.

## 2024-01-08 NOTE — PROGRESS NOTES
"Subjective      Gerda Sandhu is a 43 y.o. female who presents for annual well woman exam.  Last Pap smear 22 NILM/ HR HPV(-).  Last mammogram diagnostic with right breast US resulted birads 1. Periods are regular every 28-30 days, lasting 7 days.  In August or September menses was about 2 weeks late.  Has heavy bleeding about 3 days during menses.  Will change pad, tampon or menstrual cup every 2-4 hours.  Menses has always been heavy but is getting worse.  Has significant cramping takes Midol as needed.  No intermenstrual bleeding, spotting, or discharge.  Requesting STI testing    Current contraception: condoms  History of abnormal Pap smear: no  Family history of uterine or ovarian cancer: yes - mother uterine  Regular self breast exam: yes  History of abnormal mammogram: no  Family history of breast cancer: yes - Maternal Grandmother   History of abnormal lipids: no  Gardasil vaccine: no      Menstrual History:  OB History          1    Para   1    Term   1            AB        Living   1         SAB        IAB        Ectopic        Multiple        Live Births   1                Menarche age: 12  Patient's last menstrual period was 2023 (exact date).  Period Cycle (Days):  (monthly - had  1 menses in fall 2 week late)  Period Duration (Days): 7  Period Pattern: Regular  Menstrual Flow: Heavy, Moderate, Light (2-3 days heavy)  Menstrual Control: Tampon, Maxi pad (Cup)  Menstrual Control Change Freq (Hours): 2-4 hrs  Dysmenorrhea: (!) Moderate (No OTC)  Dysmenorrhea Symptoms: Cramping    The following portions of the patient's history were reviewed and updated as appropriate: allergies, current medications, past family history, past medical history, past social history, past surgical history, and problem list.    Review of Systems  Pertinent items are noted in HPI.      Objective      /80   Ht 5' 2\" (1.575 m)   Wt 97.5 kg (215 lb)   LMP 2023 (Exact Date)   BMI " 39.32 kg/m²     General:   alert and oriented, in no acute distress, alert, morbidly obese, appears stated age, and cooperative   Heart: regular rate and rhythm, S1, S2 normal, no murmur, click, rub or gallop   Lungs: clear to auscultation bilaterally   Abdomen: soft, non-tender, without masses or organomegaly   Vulva: normal, Bartholin's, Urethra, [place]'s normal   Vagina: normal mucosa, normal discharge, no palpable nodules   Cervix: no cervical motion tenderness and no lesions   Uterus: non-tender, difficult to assess due to body habitus   Adnexa: Nontender, difficult to assess due to body habitus   Breast: breasts appear normal, no suspicious masses, no skin or nipple changes or axillary nodes.              Assessment      @well woman  no contraindication to begin hormonal therapy@ .     Plan      All questions answered.  Await pap smear results.  Blood tests: Syphilis screening, hepatitis B, hepatitis C, HIV and gonorrhea/chlamydia.  Screening test hemoglobin A1c and lipids ordered.  Menorrhagia CBC, iron panel, TSH and free T4 ordered.  Breast self exam technique reviewed and patient encouraged to perform self-exam monthly.  Contraception: condoms.  Diagnosis explained in detail, including differential.  Dietary diary.  Discussed healthy lifestyle modifications.  Educational material distributed.  Follow up in 1 year for annual exam.  Follow up as needed.  Mammogram.  Pelvic ultrasound.  Breast awareness reviewed   Menorrhagia with regular cycle-reviewed recommendations to complete labs and pelvic ultrasound.  Reviewed treatment options for menstrual control including NSAIDs, hormonal, Lysteda and briefly surgical.  Patient is most interested in either Mirena IUD or Depo-Provera injections written information provided about both  Encouraged healthy diet, exercise and lifestyle  Encouraged follow-up with PCP as needed  Encouraged seasonal influenza vaccination  Gardasil vaccine series reviewed.  Written  information provided.  Encouraged social distancing, good hand hygiene, avoidance of crowds and masking.  Written information provided about COVID-19    Will call / mychart message with results  VBI-    BMI Counseling: Body mass index is 39.32 kg/m². The BMI is above normal. Nutrition recommendations include 3-5 servings of fruits/vegetables daily. Exercise recommendations include exercising 3-5 times per week.

## 2024-01-09 ENCOUNTER — ANNUAL EXAM (OUTPATIENT)
Dept: OBGYN CLINIC | Facility: CLINIC | Age: 44
End: 2024-01-09
Payer: COMMERCIAL

## 2024-01-09 VITALS
SYSTOLIC BLOOD PRESSURE: 124 MMHG | BODY MASS INDEX: 39.56 KG/M2 | DIASTOLIC BLOOD PRESSURE: 80 MMHG | WEIGHT: 215 LBS | HEIGHT: 62 IN

## 2024-01-09 DIAGNOSIS — Z01.419 WELL WOMAN EXAM WITH ROUTINE GYNECOLOGICAL EXAM: Primary | ICD-10-CM

## 2024-01-09 DIAGNOSIS — N92.0 MENORRHAGIA WITH REGULAR CYCLE: ICD-10-CM

## 2024-01-09 DIAGNOSIS — Z72.51 HIGH RISK SEXUAL BEHAVIOR, UNSPECIFIED TYPE: ICD-10-CM

## 2024-01-09 DIAGNOSIS — Z13.1 SCREENING FOR DIABETES MELLITUS: ICD-10-CM

## 2024-01-09 DIAGNOSIS — Z12.31 BREAST CANCER SCREENING BY MAMMOGRAM: ICD-10-CM

## 2024-01-09 DIAGNOSIS — Z13.220 LIPID SCREENING: ICD-10-CM

## 2024-01-09 PROBLEM — G47.33 OBSTRUCTIVE SLEEP APNEA SYNDROME: Status: ACTIVE | Noted: 2023-02-21

## 2024-01-09 PROBLEM — K52.9 ENTEROCOLITIS: Status: RESOLVED | Noted: 2020-03-01 | Resolved: 2024-01-09

## 2024-01-09 PROBLEM — D72.829 LEUKOCYTOSIS: Status: RESOLVED | Noted: 2018-12-02 | Resolved: 2024-01-09

## 2024-01-09 PROBLEM — A41.9 SEPSIS, UNSPECIFIED ORGANISM (HCC): Status: RESOLVED | Noted: 2020-03-01 | Resolved: 2024-01-09

## 2024-01-09 PROBLEM — E78.5 HYPERLIPIDEMIA: Status: ACTIVE | Noted: 2023-02-21

## 2024-01-09 PROBLEM — R07.9 CHEST PAIN: Status: RESOLVED | Noted: 2018-12-02 | Resolved: 2024-01-09

## 2024-01-09 PROCEDURE — S0612 ANNUAL GYNECOLOGICAL EXAMINA: HCPCS | Performed by: NURSE PRACTITIONER

## 2024-01-24 ENCOUNTER — OFFICE VISIT (OUTPATIENT)
Dept: URGENT CARE | Facility: MEDICAL CENTER | Age: 44
End: 2024-01-24
Payer: COMMERCIAL

## 2024-01-24 ENCOUNTER — APPOINTMENT (OUTPATIENT)
Dept: RADIOLOGY | Facility: MEDICAL CENTER | Age: 44
End: 2024-01-24
Payer: COMMERCIAL

## 2024-01-24 VITALS
BODY MASS INDEX: 40.97 KG/M2 | RESPIRATION RATE: 20 BRPM | HEIGHT: 61 IN | WEIGHT: 217 LBS | TEMPERATURE: 98.3 F | SYSTOLIC BLOOD PRESSURE: 130 MMHG | OXYGEN SATURATION: 99 % | HEART RATE: 91 BPM | DIASTOLIC BLOOD PRESSURE: 78 MMHG

## 2024-01-24 DIAGNOSIS — M79.671 RIGHT FOOT PAIN: ICD-10-CM

## 2024-01-24 DIAGNOSIS — S99.921A INJURY OF RIGHT FOOT, INITIAL ENCOUNTER: ICD-10-CM

## 2024-01-24 DIAGNOSIS — S99.921A INJURY OF RIGHT FOOT, INITIAL ENCOUNTER: Primary | ICD-10-CM

## 2024-01-24 PROCEDURE — 73630 X-RAY EXAM OF FOOT: CPT

## 2024-01-24 PROCEDURE — 99212 OFFICE O/P EST SF 10 MIN: CPT

## 2024-01-24 RX ORDER — NAPROXEN 500 MG/1
500 TABLET ORAL 2 TIMES DAILY WITH MEALS
Qty: 30 TABLET | Refills: 0 | Status: SHIPPED | OUTPATIENT
Start: 2024-01-24

## 2024-01-24 NOTE — PROGRESS NOTES
[facility]        NAME: Gerda Sandhu is a 43 y.o. female  : 1980    MRN: [id number]  DATE: 2024  TIME: 3:59 PM    Assessment and Plan   Injury of right foot, initial encounter [S99.921A]  1. Injury of right foot, initial encounter  XR foot 3+ vw right    naproxen (Naprosyn) 500 mg tablet    Ambulatory Referral to Podiatry      2. Right foot pain  naproxen (Naprosyn) 500 mg tablet            Patient Instructions       Follow up with PCP in 3-5 days.  Proceed to  ER if symptoms worsen.    Chief Complaint     Chief Complaint   Patient presents with   • Foot Pain     Pt was walking and rolled her R ankle, she thought she heard a pop about 23. She tried heat, ice, brace with no relief. She is having pain.          History of Present Illness       Initial injury was  while walking (she was in [place]). She rolled her right foot/ankle while walking. She has been walking on it since that time but has had continued pain. She was not seen prior to today. She has been using OTC braces, ice/heat. She typically doesn't take anything for pain unless she is on her feet a long time or the pain gets severe. She did have swelling at the time of injury but wasn't sure if it was related to the injury or typical edema from traveling. No prior injuries to her right foot. Pain at times does radiate into the knee and at times across the top of her foot. She reports she really  hasn't had any improvement in her discomfort since the time of injury.     No significant findings noted on Xray. Will do referral to podiatry given the length of time she has been having the discomfort without improvement.         Review of Systems   Review of Systems   Constitutional:  Negative for appetite change, chills, fatigue and fever.   Respiratory:  Negative for cough and shortness of breath.    Cardiovascular:  Negative for chest pain and palpitations.   Musculoskeletal:  Positive for arthralgias and gait  problem. Negative for back pain.   Skin:  Negative for color change and rash.   All other systems reviewed and are negative.        Current Medications       Current Outpatient Medications:   •  acetaminophen (TYLENOL) 325 mg tablet, Take 2 tablets (650 mg total) by mouth every 6 (six) hours as needed for mild pain, Disp: 30 tablet, Rfl: 0  •  Cholecalciferol (VITAMIN D PO), Take 5,000 Units by mouth daily , Disp: , Rfl:   •  lansoprazole (PREVACID) 15 mg capsule, Take 30 mg by mouth daily , Disp: , Rfl:   •  naproxen (Naprosyn) 500 mg tablet, Take 1 tablet (500 mg total) by mouth 2 (two) times a day with meals, Disp: 30 tablet, Rfl: 0    Current Allergies     Allergies as of 2024   • (No Known Allergies)            The following portions of the patient's history were reviewed and updated as appropriate: allergies, current medications, past family history, past medical history, past social history, past surgical history and problem list.     Past Medical History:   Diagnosis Date   • Endometriosis     They found a lesion and removed it   • Female infertility    • Polycystic ovary syndrome    • PVC (premature ventricular contraction)    • Varicella ?       Past Surgical History:   Procedure Laterality Date   • ANTERIOR CRUCIATE LIGAMENT REPAIR Left    •  SECTION     • CHOLECYSTECTOMY LAPAROSCOPIC N/A 2021    Procedure: CHOLECYSTECTOMY LAPAROSCOPIC, IOC;  Surgeon: Magdy Benedict DO;  Location: [facility];  Service: General   • CONDYLOMA EXCISION/FULGURATION  [id number]   • [place] CHOLANGIOGRAM T TUBE  2021   • WISDOM TOOTH EXTRACTION         Family History   Problem Relation Age of Onset   • BRCA2 Negative Mother    • BRCA1 Negative Mother    • Thyroid disease Mother    • Uterine cancer Mother    • Heart disease Father    • Diabetes Father    • Heart attack Father    • Skin cancer Father    • No Known Problems Brother    • Early death Brother 47   • Other Brother         uncertain  "  • No Known Problems Daughter    • Breast cancer Maternal Grandmother 95   • Diabetes Maternal Grandfather    • Heart disease Paternal Grandfather    • Heart attack Paternal Grandfather    • Cervical cancer Paternal Aunt    • Colon cancer Neg Hx    • Ovarian cancer Neg Hx          Medications have been verified.        Objective   /78   Pulse 91   Temp 98.3 °F (36.8 °C)   Resp 20   Ht 5' 1\" (1.549 m)   Wt 98.4 kg (217 lb)   LMP 12/25/2023 (Exact Date)   SpO2 99%   BMI 41.00 kg/m²        Physical Exam     Physical Exam  Vitals and nursing note reviewed.   Constitutional:       General: She is not in acute distress.     Appearance: Normal appearance. She is normal weight. She is not ill-appearing.   HENT:      Head: Normocephalic and atraumatic.   Eyes:      Extraocular Movements: Extraocular movements intact.   Cardiovascular:      Rate and Rhythm: Normal rate and regular rhythm.      Pulses: Normal pulses.           Dorsalis pedis pulses are 2+ on the right side.        Posterior tibial pulses are 2+ on the right side.      Heart sounds: Normal heart sounds.   Pulmonary:      Effort: Pulmonary effort is normal.      Breath sounds: Normal breath sounds.   Musculoskeletal:         General: Normal range of motion.      Cervical back: Normal range of motion and neck supple.      Right foot: No prominent metatarsal heads.        Feet:    Feet:      Right foot:      Skin integrity: Skin integrity normal.      Toenail Condition: Right toenails are normal.      Comments: Provider Radiology Interpretation (preliminary)   Final results will be as per official Radiology Report when available:   Sesamoid noted, calcaneus spur, no acute findings  Skin:     General: Skin is warm.      Capillary Refill: Capillary refill takes less than 2 seconds.   Neurological:      General: No focal deficit present.      Mental Status: She is alert and oriented to person, place, and time.   Psychiatric:         Mood and Affect: " Mood normal.         Behavior: Behavior normal.

## 2024-01-25 ENCOUNTER — CONSULT (OUTPATIENT)
Dept: PODIATRY | Facility: CLINIC | Age: 44
End: 2024-01-25
Payer: COMMERCIAL

## 2024-01-25 VITALS
HEIGHT: 61 IN | DIASTOLIC BLOOD PRESSURE: 82 MMHG | BODY MASS INDEX: 40.89 KG/M2 | OXYGEN SATURATION: 99 % | HEART RATE: 80 BPM | WEIGHT: 216.6 LBS | SYSTOLIC BLOOD PRESSURE: 130 MMHG

## 2024-01-25 DIAGNOSIS — M76.71 PERONEAL TENDINITIS OF RIGHT LOWER EXTREMITY: Primary | ICD-10-CM

## 2024-01-25 DIAGNOSIS — S99.921A INJURY OF RIGHT FOOT, INITIAL ENCOUNTER: ICD-10-CM

## 2024-01-25 PROCEDURE — 99203 OFFICE O/P NEW LOW 30 MIN: CPT | Performed by: STUDENT IN AN ORGANIZED HEALTH CARE EDUCATION/TRAINING PROGRAM

## 2024-01-25 RX ORDER — DEXAMETHASONE SODIUM PHOSPHATE 4 MG/ML
INJECTION, SOLUTION INTRA-ARTICULAR; INTRALESIONAL; INTRAMUSCULAR; INTRAVENOUS; SOFT TISSUE
Qty: 2 ML | Refills: 3 | Status: SHIPPED | OUTPATIENT
Start: 2024-01-25

## 2024-01-25 NOTE — PATIENT INSTRUCTIONS
Foot Pain Home Therapy    Wear supportive shoes at all times. Avoid flip-flops, flat sandals, barefoot walking (never walk barefoot, even at home). Generally avoid shoes that are too flexible and bend in the arch. Your shoes should only slightly bend in the toe area, not the middle. Running sneakers are often the best choice.  Supportive sneaker brands: Wu, On Cloud, Hoka, Altra, New Balance, Asics, Mizuno  [employer] (discount if mention Dr anne)  Fleet Feet [facility]   [place] [employer]  Supportive daily shoe brands: Vionic, Orthofeet, Kita, Dansko, Chacos, Torres, Teva, Birkenstock  Supportive home shoes: Susan Pulliam (recovery slides)  Purchase over the counter topical pain creams such as Voltarin gel, biofreeze, or CBD cream - will need to apply 2-3 times per day for benefit. + deep tissue massage.   Look in to over the counter shoe inserts/arch supports such as Superfeet, Powerstep arch supports. These are all available on [employer].com as well as their individual website's.      Adequate: hears normal conversation without difficulty

## 2024-01-25 NOTE — PROGRESS NOTES
Assessment/Plan:     Diagnoses and all orders for this visit:    Peroneal tendinitis of right lower extremity  -     Ambulatory referral to Physical Therapy; Future  -     Ankle Cude ankle/Ankle Brace  -     dexamethasone (DECADRON) 4 mg/mL; Use per Physical therapy protocol for Iontophoresis.    Injury of right foot, initial encounter  -     Ambulatory Referral to Podiatry  -     Ambulatory referral to Physical Therapy; Future  -     Ankle Cude ankle/Ankle Brace  -     dexamethasone (DECADRON) 4 mg/mL; Use per Physical therapy protocol for Iontophoresis.          Imaging Reviewed at this visit (I personally reviewed/independently interpreted images and reports in PACS)  XR right foot WB 3v: No acute osseous abnormalities noted. Small plantar calcaneal spur. Accessory navicular and os peroneum bones.      IMPRESSION:  Right foot pain with inversion injury (DOI 12/21/23). Differential diagnosis include peroneal tendonitis/strain     PLAN:  I reviewed clinical exam and radiographic imaging (XR) with patient in detail today. I have discussed with the patient the pathophysiology of this diagnosis and reviewed how the examination correlates with this diagnosis.  Urgent care note from 1/24/24 reviewed.   I explained inversion ankle injuries and injury to the peroneal tendons. I do not appreciate significant weakness thus chance of rupture is low.   Instructions were given for conservative care inclduing supportive shoes, lace up ankle brace (foot everted) to reduce tension on peroneal tendons, PT, RICE, c/w naproxen as rec per urgent care  I recommend stiff bottomed sneakers/shoes (ex Asics, Vionic, New balance, Wu, etc) for daily use and Susan Pulliam (recovery slides) for in home use.   I advised pt to obtain OTC orthotics such as powerstep or superfeet   I ordered the addition of PT for care and addition of iontophoresis   I rx'd topical steroid for iontophoresis per PT protocol  RTC 1 month; MRI if no improvement  "      Subjective:      Patient ID: Gerda Sandhu is a 43 y.o. female.    Gerda presents to clinic today concerning right foot injury sustained 12/21/23. Went to urgent care 1/24/24 and was given naproxen and referred here. Notes pain since injury when foot in certain positions.         The following portions of the patient's history were reviewed and updated as appropriate: allergies, current medications, past family history, past medical history, past social history, past surgical history, and problem list.    Review of Systems   Constitutional:  Negative for activity change, chills and fever.   HENT: Negative.     Respiratory:  Negative for cough, chest tightness and shortness of breath.    Cardiovascular:  Negative for chest pain and leg swelling.   Endocrine: Negative.    Genitourinary: Negative.    Musculoskeletal:  Positive for gait problem (R foot pain).   Neurological:  Negative for numbness.   Psychiatric/Behavioral: Negative.  Negative for agitation and behavioral problems.          Objective:      /82   Pulse 80   Ht 5' 1\" (1.549 m)   Wt 98.2 kg (216 lb 9.6 oz)   LMP 12/25/2023 (Exact Date)   SpO2 99%   BMI 40.93 kg/m²          Physical Exam  Constitutional:       General: She is not in acute distress.     Appearance: Normal appearance. She is not ill-appearing.   Cardiovascular:      Pulses: Normal pulses.      Comments: Bilateral DP & PT pulses 2/4. CRT WNL. Pedal hair present. Feet warm and well perfused.   Pulmonary:      Effort: No respiratory distress.   Musculoskeletal:         General: Tenderness (Right foot/ankle along PB & PL tendons. Pain with resisted eversion and PF/INV of foot at lateral ankle/foot.) present.      Comments: Bilateral ankle, STJ, TNJ, TMTJ, MTPJ ROM WNL and without pain. LE muscle strength WNL.   Skin:     General: Skin is warm.      Capillary Refill: Capillary refill takes less than 2 seconds.      Findings: No erythema or lesion.   Neurological:      " General: No focal deficit present.      Mental Status: She is alert and oriented to person, place, and time.      Sensory: No sensory deficit.      Comments: Gross sensation intact. Denies N/T/B to B/L feet.    Psychiatric:         Mood and Affect: Mood normal.         Behavior: Behavior normal.

## 2024-04-02 ENCOUNTER — APPOINTMENT (OUTPATIENT)
Dept: LAB | Facility: MEDICAL CENTER | Age: 44
End: 2024-04-02
Payer: COMMERCIAL

## 2024-04-02 DIAGNOSIS — N92.0 MENORRHAGIA WITH REGULAR CYCLE: ICD-10-CM

## 2024-04-02 DIAGNOSIS — Z72.51 HIGH RISK SEXUAL BEHAVIOR, UNSPECIFIED TYPE: ICD-10-CM

## 2024-04-02 DIAGNOSIS — Z13.220 LIPID SCREENING: ICD-10-CM

## 2024-04-02 DIAGNOSIS — Z13.1 SCREENING FOR DIABETES MELLITUS: ICD-10-CM

## 2024-04-02 LAB
BASOPHILS # BLD AUTO: 0.1 THOUSANDS/ÂΜL (ref 0–0.1)
BASOPHILS NFR BLD AUTO: 1 % (ref 0–1)
CHOLEST SERPL-MCNC: 158 MG/DL
EOSINOPHIL # BLD AUTO: 0.31 THOUSAND/ÂΜL (ref 0–0.61)
EOSINOPHIL NFR BLD AUTO: 3 % (ref 0–6)
ERYTHROCYTE [DISTWIDTH] IN BLOOD BY AUTOMATED COUNT: 15.7 % (ref 11.6–15.1)
EST. AVERAGE GLUCOSE BLD GHB EST-MCNC: 126 MG/DL
FERRITIN SERPL-MCNC: 17 NG/ML (ref 11–307)
HBA1C MFR BLD: 6 %
HCT VFR BLD AUTO: 39.2 % (ref 34.8–46.1)
HDLC SERPL-MCNC: 38 MG/DL
HGB BLD-MCNC: 11.9 G/DL (ref 11.5–15.4)
IMM GRANULOCYTES # BLD AUTO: 0.05 THOUSAND/UL (ref 0–0.2)
IMM GRANULOCYTES NFR BLD AUTO: 0 % (ref 0–2)
IRON SATN MFR SERPL: 8 % (ref 15–50)
IRON SERPL-MCNC: 35 UG/DL (ref 50–212)
LDLC SERPL CALC-MCNC: 98 MG/DL (ref 0–100)
LYMPHOCYTES # BLD AUTO: 2.16 THOUSANDS/ÂΜL (ref 0.6–4.47)
LYMPHOCYTES NFR BLD AUTO: 18 % (ref 14–44)
MCH RBC QN AUTO: 25.2 PG (ref 26.8–34.3)
MCHC RBC AUTO-ENTMCNC: 30.4 G/DL (ref 31.4–37.4)
MCV RBC AUTO: 83 FL (ref 82–98)
MONOCYTES # BLD AUTO: 0.71 THOUSAND/ÂΜL (ref 0.17–1.22)
MONOCYTES NFR BLD AUTO: 6 % (ref 4–12)
NEUTROPHILS # BLD AUTO: 8.64 THOUSANDS/ÂΜL (ref 1.85–7.62)
NEUTS SEG NFR BLD AUTO: 72 % (ref 43–75)
NONHDLC SERPL-MCNC: 120 MG/DL
NRBC BLD AUTO-RTO: 0 /100 WBCS
PLATELET # BLD AUTO: 434 THOUSANDS/UL (ref 149–390)
PMV BLD AUTO: 10.8 FL (ref 8.9–12.7)
RBC # BLD AUTO: 4.73 MILLION/UL (ref 3.81–5.12)
T4 FREE SERPL-MCNC: 0.82 NG/DL (ref 0.61–1.12)
TIBC SERPL-MCNC: 428 UG/DL (ref 250–450)
TREPONEMA PALLIDUM IGG+IGM AB [PRESENCE] IN SERUM OR PLASMA BY IMMUNOASSAY: NORMAL
TRIGL SERPL-MCNC: 109 MG/DL
TSH SERPL DL<=0.05 MIU/L-ACNC: 1.47 UIU/ML (ref 0.45–4.5)
UIBC SERPL-MCNC: 393 UG/DL (ref 155–355)
WBC # BLD AUTO: 11.97 THOUSAND/UL (ref 4.31–10.16)

## 2024-04-02 PROCEDURE — 85025 COMPLETE CBC W/AUTO DIFF WBC: CPT

## 2024-04-02 PROCEDURE — 86803 HEPATITIS C AB TEST: CPT

## 2024-04-02 PROCEDURE — 36415 COLL VENOUS BLD VENIPUNCTURE: CPT

## 2024-04-02 PROCEDURE — 87591 N.GONORRHOEAE DNA AMP PROB: CPT

## 2024-04-02 PROCEDURE — 87389 HIV-1 AG W/HIV-1&-2 AB AG IA: CPT

## 2024-04-02 PROCEDURE — 87491 CHLMYD TRACH DNA AMP PROBE: CPT

## 2024-04-02 PROCEDURE — 83540 ASSAY OF IRON: CPT

## 2024-04-02 PROCEDURE — 87340 HEPATITIS B SURFACE AG IA: CPT

## 2024-04-02 PROCEDURE — 86780 TREPONEMA PALLIDUM: CPT

## 2024-04-02 PROCEDURE — 80061 LIPID PANEL: CPT

## 2024-04-02 PROCEDURE — 83036 HEMOGLOBIN GLYCOSYLATED A1C: CPT

## 2024-04-02 PROCEDURE — 82728 ASSAY OF FERRITIN: CPT

## 2024-04-02 PROCEDURE — 83550 IRON BINDING TEST: CPT

## 2024-04-02 PROCEDURE — 84443 ASSAY THYROID STIM HORMONE: CPT

## 2024-04-02 PROCEDURE — 84439 ASSAY OF FREE THYROXINE: CPT

## 2024-04-03 LAB
C TRACH DNA SPEC QL NAA+PROBE: NEGATIVE
HCV AB SER QL: NORMAL
HIV 1+2 AB+HIV1 P24 AG SERPL QL IA: NORMAL
HIV 2 AB SERPL QL IA: NORMAL
HIV1 AB SERPL QL IA: NORMAL
HIV1 P24 AG SERPL QL IA: NORMAL
N GONORRHOEA DNA SPEC QL NAA+PROBE: NEGATIVE

## 2024-04-04 LAB — HBV SURFACE AG SER QL: NORMAL

## 2025-01-08 ENCOUNTER — HOSPITAL ENCOUNTER (OUTPATIENT)
Dept: MAMMOGRAPHY | Facility: HOSPITAL | Age: 45
Discharge: HOME | End: 2025-01-08
Payer: COMMERCIAL

## 2025-01-08 VITALS — HEIGHT: 61 IN | WEIGHT: 216 LBS | BODY MASS INDEX: 40.78 KG/M2

## 2025-01-08 DIAGNOSIS — Z12.31 BREAST CANCER SCREENING BY MAMMOGRAM: ICD-10-CM

## 2025-01-08 PROCEDURE — 77067 SCR MAMMO BI INCL CAD: CPT

## 2025-01-08 PROCEDURE — 77063 BREAST TOMOSYNTHESIS BI: CPT

## 2025-01-09 ENCOUNTER — RESULTS FOLLOW-UP (OUTPATIENT)
Dept: OBGYN CLINIC | Facility: MEDICAL CENTER | Age: 45
End: 2025-01-09

## 2025-03-04 ENCOUNTER — APPOINTMENT (OUTPATIENT)
Dept: LAB | Facility: HOSPITAL | Age: 45
End: 2025-03-04
Payer: COMMERCIAL

## 2025-03-04 DIAGNOSIS — K43.9 VENTRAL HERNIA WITHOUT OBSTRUCTION OR GANGRENE: ICD-10-CM

## 2025-03-04 DIAGNOSIS — E55.9 VITAMIN D DEFICIENCY: ICD-10-CM

## 2025-03-04 DIAGNOSIS — E78.5 HYPERLIPIDEMIA, UNSPECIFIED HYPERLIPIDEMIA TYPE: ICD-10-CM

## 2025-03-04 LAB
25(OH)D3 SERPL-MCNC: 86.4 NG/ML (ref 30–100)
ALBUMIN SERPL BCG-MCNC: 4.4 G/DL (ref 3.5–5)
ALP SERPL-CCNC: 62 U/L (ref 34–104)
ALT SERPL W P-5'-P-CCNC: 14 U/L (ref 7–52)
ANION GAP SERPL CALCULATED.3IONS-SCNC: 5 MMOL/L (ref 4–13)
AST SERPL W P-5'-P-CCNC: 13 U/L (ref 13–39)
BASOPHILS # BLD AUTO: 0.08 THOUSANDS/ÂΜL (ref 0–0.1)
BASOPHILS NFR BLD AUTO: 1 % (ref 0–1)
BILIRUB SERPL-MCNC: 0.38 MG/DL (ref 0.2–1)
BUN SERPL-MCNC: 12 MG/DL (ref 5–25)
CALCIUM SERPL-MCNC: 9.2 MG/DL (ref 8.4–10.2)
CHLORIDE SERPL-SCNC: 104 MMOL/L (ref 96–108)
CHOLEST SERPL-MCNC: 141 MG/DL (ref ?–200)
CO2 SERPL-SCNC: 28 MMOL/L (ref 21–32)
CREAT SERPL-MCNC: 0.79 MG/DL (ref 0.6–1.3)
EOSINOPHIL # BLD AUTO: 0.28 THOUSAND/ÂΜL (ref 0–0.61)
EOSINOPHIL NFR BLD AUTO: 2 % (ref 0–6)
ERYTHROCYTE [DISTWIDTH] IN BLOOD BY AUTOMATED COUNT: 15.4 % (ref 11.6–15.1)
EST. AVERAGE GLUCOSE BLD GHB EST-MCNC: 120 MG/DL
GFR SERPL CREATININE-BSD FRML MDRD: 91 ML/MIN/1.73SQ M
GLUCOSE SERPL-MCNC: 98 MG/DL (ref 65–140)
HBA1C MFR BLD: 5.8 %
HCT VFR BLD AUTO: 38 % (ref 34.8–46.1)
HDLC SERPL-MCNC: 35 MG/DL
HGB BLD-MCNC: 11.7 G/DL (ref 11.5–15.4)
IMM GRANULOCYTES # BLD AUTO: 0.04 THOUSAND/UL (ref 0–0.2)
IMM GRANULOCYTES NFR BLD AUTO: 0 % (ref 0–2)
LDLC SERPL CALC-MCNC: 83 MG/DL (ref 0–100)
LYMPHOCYTES # BLD AUTO: 1.99 THOUSANDS/ÂΜL (ref 0.6–4.47)
LYMPHOCYTES NFR BLD AUTO: 13 % (ref 14–44)
MCH RBC QN AUTO: 24.9 PG (ref 26.8–34.3)
MCHC RBC AUTO-ENTMCNC: 30.8 G/DL (ref 31.4–37.4)
MCV RBC AUTO: 81 FL (ref 82–98)
MONOCYTES # BLD AUTO: 0.89 THOUSAND/ÂΜL (ref 0.17–1.22)
MONOCYTES NFR BLD AUTO: 6 % (ref 4–12)
NEUTROPHILS # BLD AUTO: 11.63 THOUSANDS/ÂΜL (ref 1.85–7.62)
NEUTS SEG NFR BLD AUTO: 78 % (ref 43–75)
NONHDLC SERPL-MCNC: 106 MG/DL
NRBC BLD AUTO-RTO: 0 /100 WBCS
PLATELET # BLD AUTO: 401 THOUSANDS/UL (ref 149–390)
PMV BLD AUTO: 10.1 FL (ref 8.9–12.7)
POTASSIUM SERPL-SCNC: 3.9 MMOL/L (ref 3.5–5.3)
PROT SERPL-MCNC: 7.3 G/DL (ref 6.4–8.4)
RBC # BLD AUTO: 4.7 MILLION/UL (ref 3.81–5.12)
SODIUM SERPL-SCNC: 137 MMOL/L (ref 135–147)
TRIGL SERPL-MCNC: 116 MG/DL (ref ?–150)
TSH SERPL DL<=0.05 MIU/L-ACNC: 1.93 UIU/ML (ref 0.45–4.5)
WBC # BLD AUTO: 14.91 THOUSAND/UL (ref 4.31–10.16)

## 2025-03-04 PROCEDURE — 85025 COMPLETE CBC W/AUTO DIFF WBC: CPT

## 2025-03-04 PROCEDURE — 84443 ASSAY THYROID STIM HORMONE: CPT

## 2025-03-04 PROCEDURE — 82306 VITAMIN D 25 HYDROXY: CPT

## 2025-03-04 PROCEDURE — 83036 HEMOGLOBIN GLYCOSYLATED A1C: CPT

## 2025-03-04 PROCEDURE — 80053 COMPREHEN METABOLIC PANEL: CPT

## 2025-03-04 PROCEDURE — 36415 COLL VENOUS BLD VENIPUNCTURE: CPT

## 2025-03-04 PROCEDURE — 80061 LIPID PANEL: CPT

## 2025-03-06 NOTE — H&P
H&P    Referring Provider: Navneet Bright DO    CC: Supraumbilical bulging and pain    HPI: The patient is a 44-year-old female who states that she has a bulging of her supraumbilical area which is causing her discomfort. She denies any postprandial nausea or vomiting or abdominal cramping. She denies any change in her bowel habits. Upon reviewing the patient's medication list, she is taking Tylenol with codeine. The patient's surgery include a laparoscopic cholecystectomy and laparoscopy for endometriosis. The patient states she has been taking it for the past 5 years on and off for pain. I advised her that if she does decide to have the surgery, pain control could be an issue postoperatively. The patient understands.    History:  No past medical history on file.    Past Surgical History:   Procedure Laterality Date   [place] ARTHROSCOPY KNEE DIAGNOSTIC W/WO SYNOVIAL BX SPX Left    ACL replacement   MN  DELIVERY ONLY 08/15/2009   MN CHOLECYSTECTOMY    MN EXPLORATORY LAPAROTOMY CELIOTOMY W/WO BIOPSY SPX    for endometriosis     Family History   Problem Relation Name Age of Onset   Cancer Mother   uterine   Diabetes Father   type 2   Cancer Father   skin, lip   Cancer Grandmother (Maternal)   breast   Diabetes Grandfather (Maternal)   type 2   Heart Disorder Grandfather (Paternal)   heart attack   Heart Disorder Uncle (Paternal) multiple     Social History     Tobacco Use   Smoking status: Never   Smokeless tobacco: Never   Substance Use Topics   Alcohol use: Yes   Alcohol/week: 1.0 standard drink of alcohol   Types: 1 1.5 oz of liquor per week   Drug use: No     Review of patient's allergies indicates:  No Known Allergies    Current Outpatient Medications   Medication Sig Dispense Refill   Cholecalciferol ([employer] VIT D 5000 HIGH-POTENCY) 5000 UNITS Capsule Take 1 Capsule by mouth in the morning.   Omeprazole 20 MG Oral Tablet Delayed Release Take by mouth.   Acetaminophen-Codeine 300-30 MG Oral Tablet  "Take by mouth.     No current facility-administered medications for this visit.       ROS:  Ten point review of systems is negative except what was discussed in the HPI    PE:  Vital Signs: /76  Pulse 88  Ht 1.549 m (5' 1\")  Wt 96.3 kg (212 lb 3.2 oz)  LMP 02/11/2025  BMI 40.09 kg/m²  BSA 2.04 m² 97.9F RR 20 97% RA    Gen: Awake and alert, nontoxic appearing, no distress  HEENT: Nonicteric  Lungs: Clear to auscultation bilaterally  Heart: Regular rate and rhythm  Abdomen: Soft, moderate sized supraumbilical hernia, reducible but tender upon reducing, no rebound, no guarding  Skin: No jaundice  Neuro: No focal deficits    Assessment:  Supraumbilical hernia     Plan: I discussed all the options with the patient. An open repair versus robotic assisted laparoscopic repair were discussed with her. She would prefer the robotic assisted laparoscopic repair. The risks of the procedure include, but are not limited to, bleeding, infection, bowel injury, mesh reaction, poor wound healing, and/or recurrent hernia. She understands and consent was obtained. She will be scheduled at her convenience.   "

## 2025-03-20 ENCOUNTER — APPOINTMENT (OUTPATIENT)
Dept: LAB | Facility: HOSPITAL | Age: 45
End: 2025-03-20
Payer: COMMERCIAL

## 2025-03-20 DIAGNOSIS — D72.829 LEUKOCYTOSIS, UNSPECIFIED TYPE: ICD-10-CM

## 2025-03-20 LAB
BASOPHILS # BLD AUTO: 0.07 THOUSANDS/ÂΜL (ref 0–0.1)
BASOPHILS NFR BLD AUTO: 1 % (ref 0–1)
EOSINOPHIL # BLD AUTO: 0.18 THOUSAND/ÂΜL (ref 0–0.61)
EOSINOPHIL NFR BLD AUTO: 2 % (ref 0–6)
ERYTHROCYTE [DISTWIDTH] IN BLOOD BY AUTOMATED COUNT: 15 % (ref 11.6–15.1)
HCT VFR BLD AUTO: 38.7 % (ref 34.8–46.1)
HGB BLD-MCNC: 11.8 G/DL (ref 11.5–15.4)
IMM GRANULOCYTES # BLD AUTO: 0.04 THOUSAND/UL (ref 0–0.2)
IMM GRANULOCYTES NFR BLD AUTO: 0 % (ref 0–2)
LYMPHOCYTES # BLD AUTO: 2.33 THOUSANDS/ÂΜL (ref 0.6–4.47)
LYMPHOCYTES NFR BLD AUTO: 22 % (ref 14–44)
MCH RBC QN AUTO: 24.5 PG (ref 26.8–34.3)
MCHC RBC AUTO-ENTMCNC: 30.5 G/DL (ref 31.4–37.4)
MCV RBC AUTO: 81 FL (ref 82–98)
MONOCYTES # BLD AUTO: 0.54 THOUSAND/ÂΜL (ref 0.17–1.22)
MONOCYTES NFR BLD AUTO: 5 % (ref 4–12)
NEUTROPHILS # BLD AUTO: 7.66 THOUSANDS/ÂΜL (ref 1.85–7.62)
NEUTS SEG NFR BLD AUTO: 70 % (ref 43–75)
NRBC BLD AUTO-RTO: 0 /100 WBCS
PLATELET # BLD AUTO: 440 THOUSANDS/UL (ref 149–390)
PMV BLD AUTO: 10.8 FL (ref 8.9–12.7)
RBC # BLD AUTO: 4.81 MILLION/UL (ref 3.81–5.12)
WBC # BLD AUTO: 10.82 THOUSAND/UL (ref 4.31–10.16)

## 2025-03-20 PROCEDURE — 85025 COMPLETE CBC W/AUTO DIFF WBC: CPT

## 2025-03-20 PROCEDURE — 36415 COLL VENOUS BLD VENIPUNCTURE: CPT

## 2025-04-04 RX ORDER — ACETAMINOPHEN AND CODEINE PHOSPHATE 300; 30 MG/1; MG/1
TABLET ORAL
COMMUNITY
Start: 2025-02-28

## 2025-04-04 RX ORDER — CYCLOBENZAPRINE HCL 5 MG
TABLET ORAL
COMMUNITY
Start: 2025-02-25

## 2025-04-04 NOTE — PRE-PROCEDURE INSTRUCTIONS
Pre-Surgery Instructions:   Medication Instructions    acetaminophen (TYLENOL) 325 mg tablet Uses PRN- OK to take day of surgery    acetaminophen-codeine (TYLENOL with CODEINE #3) 300-30 MG per tablet Uses PRN- OK to take day of surgery    Cholecalciferol (VITAMIN D PO) Hold day of surgery.    cyclobenzaprine (FLEXERIL) 5 mg tablet Uses PRN- OK to take day of surgery    naproxen (Naprosyn) 500 mg tablet Stop taking 5 days prior to surgery.    Omeprazole 20 MG TBEC Take night before surgery      Medication instructions for day of surgery reviewed. Please take all instructed medications with only a sip of water.       You will receive a call one business day prior to surgery with an arrival time and hospital directions. If your surgery is scheduled on a Monday, the hospital will be calling you on the Friday prior to your surgery. If you have not heard from anyone by 8pm, please call the hospital supervisor through the hospital  at [phone].     Do not eat or drink anything after midnight the night before your surgery, including candy, mints, lifesavers, or chewing gum. Do not drink alcohol 24hrs before your surgery. Try not to smoke at least 24hrs before your surgery.       Follow the pre surgery showering instructions as listed in the “My Surgical Experience Booklet” or otherwise provided by your surgeon's office. Do not use a blade to shave the surgical area 1 week before surgery. It is okay to use a clean electric clippers up to 24 hours before surgery. Do not apply any lotions, creams, including makeup, cologne, deodorant, or perfumes after showering on the day of your surgery. Do not use dry shampoo, hair spray, hair gel, or any type of hair products.     No contact lenses, eye make-up, or artificial eyelashes. Remove nail polish, including gel polish, and any artificial, gel, or acrylic nails if possible. Remove all jewelry including rings and body piercing jewelry.     Wear causal clothing that is  easy to take on and off. Consider your type of surgery.    Keep any valuables, jewelry, piercings at home. Please bring any specially ordered equipment (sling, braces) if indicated.    Arrange for a responsible person to drive you to and from the hospital on the day of your surgery. Please confirm the visitor policy for the day of your procedure when you receive your phone call with an arrival time.     Call the surgeon's office with any new illnesses, exposures, or additional questions prior to surgery.    Please reference your “My Surgical Experience Booklet” for additional information to prepare for your upcoming surgery.

## 2025-04-07 ENCOUNTER — ANESTHESIA EVENT (OUTPATIENT)
Dept: PERIOP | Facility: HOSPITAL | Age: 45
End: 2025-04-07
Payer: COMMERCIAL

## 2025-04-07 NOTE — ANESTHESIA PREPROCEDURE EVALUATION
Procedure:  ROBOTIC ASSISTED LAPAROSCOPIC VENTRAL HERNIA REPAIR WITH MESH (Abdomen)    Relevant Problems   CARDIO   (+) Hyperlipidemia      MUSCULOSKELETAL   (+) Chronic low back pain      NEURO/PSYCH   (+) Chronic low back pain      PULMONARY   (+) Obstructive sleep apnea syndrome      BMI 40    TTE 2018  SUMMARY     LEFT VENTRICLE:  Size was normal.  Systolic function was normal. Ejection fraction was estimated to be 60 %.  There were no regional wall motion abnormalities.  Wall thickness was normal.  There was no evidence of concentric hypertrophy.     TRICUSPID VALVE:  There was trace regurgitation.      Physical Exam    Airway    Mallampati score: II  TM Distance: >3 FB  Neck ROM: full     Dental   No notable dental hx     Cardiovascular  Cardiovascular exam normal    Pulmonary  Pulmonary exam normal     Other Findings  post-pubertal.      Anesthesia Plan  ASA Score- 3     Anesthesia Type- general with ASA Monitors.         Additional Monitors:     Airway Plan: ETT.           Plan Factors-Exercise tolerance (METS): >4 METS.    Chart reviewed.  Imaging results reviewed. Existing labs reviewed. Patient summary reviewed.    Patient is not a current smoker.      Obstructive sleep apnea risk education given perioperatively.        Induction- intravenous.    Postoperative Plan- Plan for postoperative opioid use. Planned trial extubation    Perioperative Resuscitation Plan - Level 1 - Full Code.       Informed Consent- Anesthetic plan and risks discussed with patient.        NPO Status:  No vitals data found for the desired time range.

## 2025-04-09 ENCOUNTER — ANESTHESIA (OUTPATIENT)
Dept: PERIOP | Facility: HOSPITAL | Age: 45
End: 2025-04-09
Payer: COMMERCIAL

## 2025-04-09 ENCOUNTER — HOSPITAL ENCOUNTER (OUTPATIENT)
Facility: HOSPITAL | Age: 45
Setting detail: OUTPATIENT SURGERY
Discharge: HOME | End: 2025-04-09
Attending: SURGERY | Admitting: SURGERY
Payer: COMMERCIAL

## 2025-04-09 VITALS
HEIGHT: 61 IN | WEIGHT: 216 LBS | SYSTOLIC BLOOD PRESSURE: 121 MMHG | DIASTOLIC BLOOD PRESSURE: 59 MMHG | OXYGEN SATURATION: 94 % | RESPIRATION RATE: 20 BRPM | BODY MASS INDEX: 40.78 KG/M2 | HEART RATE: 84 BPM | TEMPERATURE: 97.5 F

## 2025-04-09 DIAGNOSIS — K43.9 VENTRAL HERNIA WITHOUT OBSTRUCTION OR GANGRENE: Primary | ICD-10-CM

## 2025-04-09 LAB
EXT PREGNANCY TEST URINE: NEGATIVE
EXT. CONTROL: NORMAL

## 2025-04-09 PROCEDURE — C1781 MESH (IMPLANTABLE): HCPCS | Performed by: SURGERY

## 2025-04-09 PROCEDURE — 81025 URINE PREGNANCY TEST: CPT | Performed by: SURGERY

## 2025-04-09 DEVICE — VENTRALIGHT ST MESH WITH ECHO 2 POSITIONING SYSTEM, 4.5" (11 CM), CIRCLE
Type: IMPLANTABLE DEVICE | Status: FUNCTIONAL
Brand: VENTRALIGHT

## 2025-04-09 RX ORDER — SODIUM CHLORIDE, SODIUM LACTATE, POTASSIUM CHLORIDE, CALCIUM CHLORIDE 600; 310; 30; 20 MG/100ML; MG/100ML; MG/100ML; MG/100ML
INJECTION, SOLUTION INTRAVENOUS CONTINUOUS PRN
Status: DISCONTINUED | OUTPATIENT
Start: 2025-04-09 | End: 2025-04-09

## 2025-04-09 RX ORDER — FENTANYL CITRATE 50 UG/ML
INJECTION, SOLUTION INTRAMUSCULAR; INTRAVENOUS AS NEEDED
Status: DISCONTINUED | OUTPATIENT
Start: 2025-04-09 | End: 2025-04-09

## 2025-04-09 RX ORDER — SCOPOLAMINE 1 MG/3D
1 PATCH, EXTENDED RELEASE TRANSDERMAL
Status: DISCONTINUED | OUTPATIENT
Start: 2025-04-09 | End: 2025-04-09 | Stop reason: HOSPADM

## 2025-04-09 RX ORDER — HYDROMORPHONE HCL/PF 1 MG/ML
SYRINGE (ML) INJECTION AS NEEDED
Status: DISCONTINUED | OUTPATIENT
Start: 2025-04-09 | End: 2025-04-09

## 2025-04-09 RX ORDER — HYDROMORPHONE HCL/PF 1 MG/ML
0.5 SYRINGE (ML) INJECTION
Status: DISCONTINUED | OUTPATIENT
Start: 2025-04-09 | End: 2025-04-09 | Stop reason: HOSPADM

## 2025-04-09 RX ORDER — MAGNESIUM HYDROXIDE 1200 MG/15ML
LIQUID ORAL AS NEEDED
Status: DISCONTINUED | OUTPATIENT
Start: 2025-04-09 | End: 2025-04-09 | Stop reason: HOSPADM

## 2025-04-09 RX ORDER — DEXAMETHASONE SODIUM PHOSPHATE 10 MG/ML
INJECTION, SOLUTION INTRAMUSCULAR; INTRAVENOUS AS NEEDED
Status: DISCONTINUED | OUTPATIENT
Start: 2025-04-09 | End: 2025-04-09

## 2025-04-09 RX ORDER — LIDOCAINE HYDROCHLORIDE 10 MG/ML
INJECTION, SOLUTION EPIDURAL; INFILTRATION; INTRACAUDAL; PERINEURAL AS NEEDED
Status: DISCONTINUED | OUTPATIENT
Start: 2025-04-09 | End: 2025-04-09

## 2025-04-09 RX ORDER — SODIUM CHLORIDE, SODIUM LACTATE, POTASSIUM CHLORIDE, CALCIUM CHLORIDE 600; 310; 30; 20 MG/100ML; MG/100ML; MG/100ML; MG/100ML
50 INJECTION, SOLUTION INTRAVENOUS CONTINUOUS
Status: DISCONTINUED | OUTPATIENT
Start: 2025-04-09 | End: 2025-04-09 | Stop reason: HOSPADM

## 2025-04-09 RX ORDER — METOCLOPRAMIDE HYDROCHLORIDE 5 MG/ML
INJECTION INTRAMUSCULAR; INTRAVENOUS AS NEEDED
Status: DISCONTINUED | OUTPATIENT
Start: 2025-04-09 | End: 2025-04-09

## 2025-04-09 RX ORDER — PROPOFOL 10 MG/ML
INJECTION, EMULSION INTRAVENOUS AS NEEDED
Status: DISCONTINUED | OUTPATIENT
Start: 2025-04-09 | End: 2025-04-09

## 2025-04-09 RX ORDER — ROCURONIUM BROMIDE 10 MG/ML
INJECTION, SOLUTION INTRAVENOUS AS NEEDED
Status: DISCONTINUED | OUTPATIENT
Start: 2025-04-09 | End: 2025-04-09

## 2025-04-09 RX ORDER — CEFAZOLIN SODIUM 2 G/50ML
2000 SOLUTION INTRAVENOUS ONCE
Status: COMPLETED | OUTPATIENT
Start: 2025-04-09 | End: 2025-04-09

## 2025-04-09 RX ORDER — OXYCODONE AND ACETAMINOPHEN 5; 325 MG/1; MG/1
1 TABLET ORAL EVERY 4 HOURS PRN
Qty: 20 TABLET | Refills: 0 | Status: SHIPPED | OUTPATIENT
Start: 2025-04-09 | End: 2025-04-19

## 2025-04-09 RX ORDER — MIDAZOLAM HYDROCHLORIDE 2 MG/2ML
INJECTION, SOLUTION INTRAMUSCULAR; INTRAVENOUS AS NEEDED
Status: DISCONTINUED | OUTPATIENT
Start: 2025-04-09 | End: 2025-04-09

## 2025-04-09 RX ORDER — KETOROLAC TROMETHAMINE 30 MG/ML
INJECTION, SOLUTION INTRAMUSCULAR; INTRAVENOUS AS NEEDED
Status: DISCONTINUED | OUTPATIENT
Start: 2025-04-09 | End: 2025-04-09

## 2025-04-09 RX ORDER — ONDANSETRON 2 MG/ML
INJECTION INTRAMUSCULAR; INTRAVENOUS AS NEEDED
Status: DISCONTINUED | OUTPATIENT
Start: 2025-04-09 | End: 2025-04-09

## 2025-04-09 RX ORDER — ACETAMINOPHEN 10 MG/ML
1000 INJECTION, SOLUTION INTRAVENOUS ONCE
Status: COMPLETED | OUTPATIENT
Start: 2025-04-09 | End: 2025-04-09

## 2025-04-09 RX ADMIN — CEFAZOLIN SODIUM 2000 MG: 2 SOLUTION INTRAVENOUS at 12:05

## 2025-04-09 RX ADMIN — METOCLOPRAMIDE HYDROCHLORIDE 10 MG: 5 INJECTION INTRAMUSCULAR; INTRAVENOUS at 13:51

## 2025-04-09 RX ADMIN — PROPOFOL 200 MG: 10 INJECTION, EMULSION INTRAVENOUS at 11:54

## 2025-04-09 RX ADMIN — FENTANYL CITRATE 100 MCG: 0.05 INJECTION, SOLUTION INTRAMUSCULAR; INTRAVENOUS at 11:51

## 2025-04-09 RX ADMIN — HYDROMORPHONE HYDROCHLORIDE 0.5 MG: 1 INJECTION, SOLUTION INTRAMUSCULAR; INTRAVENOUS; SUBCUTANEOUS at 15:15

## 2025-04-09 RX ADMIN — MIDAZOLAM 2 MG: 1 INJECTION INTRAMUSCULAR; INTRAVENOUS at 11:48

## 2025-04-09 RX ADMIN — SUGAMMADEX 200 MG: 100 INJECTION, SOLUTION INTRAVENOUS at 14:11

## 2025-04-09 RX ADMIN — PROPOFOL 100 MCG/KG/MIN: 10 INJECTION, EMULSION INTRAVENOUS at 12:05

## 2025-04-09 RX ADMIN — DEXAMETHASONE SODIUM PHOSPHATE 10 MG: 10 INJECTION, SOLUTION INTRAMUSCULAR; INTRAVENOUS at 11:58

## 2025-04-09 RX ADMIN — ACETAMINOPHEN 1000 MG: 1000 INJECTION INTRAVENOUS at 13:40

## 2025-04-09 RX ADMIN — ROCURONIUM BROMIDE 50 MG: 10 INJECTION, SOLUTION INTRAVENOUS at 11:54

## 2025-04-09 RX ADMIN — SODIUM CHLORIDE, SODIUM LACTATE, POTASSIUM CHLORIDE, AND CALCIUM CHLORIDE: .6; .31; .03; .02 INJECTION, SOLUTION INTRAVENOUS at 13:30

## 2025-04-09 RX ADMIN — ONDANSETRON 4 MG: 2 INJECTION INTRAMUSCULAR; INTRAVENOUS at 11:58

## 2025-04-09 RX ADMIN — HYDROMORPHONE HYDROCHLORIDE 0.5 MG: 1 INJECTION, SOLUTION INTRAMUSCULAR; INTRAVENOUS; SUBCUTANEOUS at 13:52

## 2025-04-09 RX ADMIN — LIDOCAINE HYDROCHLORIDE 50 MG: 10 INJECTION, SOLUTION EPIDURAL; INFILTRATION; INTRACAUDAL; PERINEURAL at 11:54

## 2025-04-09 RX ADMIN — KETOROLAC TROMETHAMINE 30 MG: 30 INJECTION, SOLUTION INTRAMUSCULAR; INTRAVENOUS at 13:58

## 2025-04-09 RX ADMIN — SCOLOPAMINE TRANSDERMAL SYSTEM 1 PATCH: 1 PATCH, EXTENDED RELEASE TRANSDERMAL at 11:21

## 2025-04-09 RX ADMIN — SODIUM CHLORIDE, SODIUM LACTATE, POTASSIUM CHLORIDE, AND CALCIUM CHLORIDE: .6; .31; .03; .02 INJECTION, SOLUTION INTRAVENOUS at 11:48

## 2025-04-09 NOTE — ANESTHESIA POSTPROCEDURE EVALUATION
Post-Op Assessment Note    CV Status:  Stable  Pain Score: 0    Pain management: adequate       Mental Status:  Alert and awake   Hydration Status:  Euvolemic   PONV Controlled:  Controlled   Airway Patency:  Patent     Post Op Vitals Reviewed: Yes    No anethesia notable event occurred.    Staff: CRNA           Last Filed PACU Vitals:  Vitals Value Taken Time   Temp 98.6 °F (37 °C) 04/09/25 1422   Pulse 94 04/09/25 1422    81    Resp 24 04/09/25 1422   SpO2 96 % 04/09/25 1422   Vitals shown include unfiled device data.

## 2025-04-09 NOTE — DISCHARGE INSTR - AVS FIRST PAGE
1.  Remove gauze dressings in 24 hours.  Leave white Steri-Strips on incisions.  Apply nothing to incisions.  2.  May use ice pack as needed.  3.  No driving until further notice.  4.  No pushing, pulling, and/or lifting greater than 5 pounds until further notice.  5.  Call Dr. Murphy's office if you have any questions or concerns prior to your postoperative appointment.

## 2025-04-10 NOTE — ANESTHESIA POSTPROCEDURE EVALUATION
Post-Op Assessment Note    CV Status:  Stable  Pain Score: 1    Pain management: adequate       Mental Status:  Alert   PONV Controlled:  None   Airway Patency:  Patent  Two or more mitigation strategies used for obstructive sleep apnea   Post Op Vitals Reviewed: Yes    No anethesia notable event occurred.    Staff: Anesthesiologist           Last Filed PACU Vitals:  Vitals Value Taken Time   Temp 97.5 °F (36.4 °C) 04/09/25 1725   Pulse 86 04/09/25 1725   /60 04/09/25 1725   Resp 21 04/09/25 1725   SpO2 93 % 04/09/25 1725       Modified Minnie:     Vitals Value Taken Time   Activity 2 04/09/25 1725   Respiration 2 04/09/25 1725   Circulation 2 04/09/25 1725   Consciousness 2 04/09/25 1725   Oxygen Saturation 2 04/09/25 1725     Modified Minnie Score: 10

## 2025-04-10 NOTE — OP NOTE
OPERATIVE REPORT  PATIENT NAME: Gerda Sandhu    :  1980  MRN: [id number]  Pt Location: [facility] OR ROOM 03    SURGERY DATE: 2025    Surgeons and Role:     * Lia Murphy, DO - Primary     * Jina Fuentes PA-C - Assisting    Preop Diagnosis:  Ventral hernia without obstruction or gangrene [K43.9]    Post-Op Diagnosis Codes:     * Ventral hernia without obstruction or gangrene [K43.9]    Procedure(s):  ROBOTIC ASSISTED LAPAROSCOPIC VENTRAL HERNIA REPAIR WITH MESH    Specimen(s):  * No specimens in log *    Estimated Blood Loss:   Minimal    Drains:  [REMOVED] NG/OG/Enteral Tube Orogastric 18 Fr Center mouth (Removed)   Number of days: 0       Anesthesia Type:   General    Operative Indications:  Ventral hernia without obstruction or gangrene [K43.9]      Operative Findings:  There were 2 defects the main defect that was supraumbilical measured approximately 2 cm x 1.5 cm.  Approximately 1 cm inferior to this defect was a an approximately 1 cm x 1.75 cm defect.  There was fatty tissue in the defects.  No intestinal contents within the defects.        Complications:   None    Procedure and Technique:  After obtaining informed consent, the patient was brought the operating room and placed in the supine position.  SCDs were placed and she was placed under general anesthesia.  She was prepped and draped in usual sterile fashion.  A timeout was then performed.  Appropriate antibiotics had been administered.  Cuevas's point was infiltrated with local anesthetic and a small incision was made through the skin.  Veress needle was inserted and verified to be intraperitoneal via the drip test.  Veress needle was removed and an 8 mm robotic trocar was placed under direct visualization using the visiport.  Pneumoperitoneum to 15 mmHg was then established.  The robotic camera was then placed and surrounding structures were inspected and there was no injury to any surrounding structures.  Under direct  laparoscopic visualization, two 8 mm left paramedian trocars were placed.  The robot was then brought into the field and docked in the usual manner.  I then broke scrub and proceeded to the console.  The peritoneum lateral to the defect was incised.  Peritoneum was then dissected free from the muscle with blunt dissection.  Good hemostasis was maintained with cautery.  Large amount of fatty tissue was reduced from the supraumbilical hernia.  This defect measured 2 cm x 1.75 cm.  1 cm inferior to this defect was a second defect which measured 1 cm x 1.75 cm and contained fatty tissue which was fully reduced as well.  No further defects were identified.  The defects were closed primarily with 0 V-Loc nonabsorbable sutures.  11.4 cm round Ventralight ST with echo positioning system mesh was then introduced into the abdomen.  The mesh was then abutted to the anterior abdominal wall over the defects with the positioning system in the usual manner.  Mesh was then secured with running sutures of 3-0 absorbable V-Loc sutures.  Positioning system was then removed from the abdomen.  All sponge, needle, and instrument counts were correct.  The instruments were removed under direct visualization.  The robot was then undocked in the usual manner.  The incisions were injected with local anesthetic.  The skin incisions were closed with running subcuticular stitches of 4-0 Monocryl suture.  The incisions were cleansed well and benzoin, Steri-Strips, and sterile dressings were applied.  The patient was extubated in the operating room without difficulty.  The patient tolerated the procedure well was transferred to recovery in stable and satisfactory condition.   I was present for the entire procedure.    Patient Disposition:  PACU              SIGNATURE: Lia Murphy DO  DATE: April 10, 2025  TIME: 5:08 PM

## (undated) DEVICE — GENERAL ENDOSCOPY PACK: Brand: CONVERTORS

## (undated) DEVICE — TROCAR: Brand: KII FIOS FIRST ENTRY

## (undated) DEVICE — DECANTER: Brand: UNBRANDED

## (undated) DEVICE — TRANSPOSAL ULTRAFLEX DUO/QUAD ULTRA CART MANIFOLD

## (undated) DEVICE — TUBING SUCTION 5MM X 12 FT

## (undated) DEVICE — 3M™ TEGADERM™ TRANSPARENT FILM DRESSING FRAME STYLE, 1624W, 2-3/8 IN X 2-3/4 IN (6 CM X 7 CM), 100/CT 4CT/CASE: Brand: 3M™ TEGADERM™

## (undated) DEVICE — ALLENTOWN LAP CHOLE APP PACK: Brand: CARDINAL HEALTH

## (undated) DEVICE — 3M™ STERI-STRIP™ REINFORCED ADHESIVE SKIN CLOSURES, R1547, 1/2 IN X 4 IN (12 MM X 100 MM), 6 STRIPS/ENVELOPE: Brand: 3M™ STERI-STRIP™

## (undated) DEVICE — 1820 FOAM BLOCK NEEDLE COUNTER: Brand: DEVON

## (undated) DEVICE — SYRINGE 10ML LL

## (undated) DEVICE — PENCIL ELECTROSURG E-Z CLEAN -0035H

## (undated) DEVICE — GLOVE INDICATOR PI UNDERGLOVE SZ 7 BLUE

## (undated) DEVICE — NEEDLE 25G X 1 1/2

## (undated) DEVICE — Device

## (undated) DEVICE — TUBING SMOKE EVAC W/FILTRATION DEVICE PLUMEPORT ACTIV

## (undated) DEVICE — ANTI-FOG SOLUTION WITH FOAM PAD: Brand: DEVON

## (undated) DEVICE — SPONGE LAP 18 X 18 IN

## (undated) DEVICE — 5 MM CURVED DISSECTORS WITH MONOPOLAR CAUTERY: Brand: ENDOPATH

## (undated) DEVICE — SUT VICRYL 3-0 REEL 54 IN J285G

## (undated) DEVICE — GLOVE SRG BIOGEL ECLIPSE 7

## (undated) DEVICE — CHLORAPREP HI-LITE 26ML ORANGE

## (undated) DEVICE — NEEDLE 23G X 1 1/2 SAFETY-GLIDE THIN WALL

## (undated) DEVICE — VIOLET BRAIDED (POLYGLACTIN 910), SYNTHETIC ABSORBABLE SUTURE: Brand: COATED VICRYL

## (undated) DEVICE — TROCAR: Brand: KII SLEEVE

## (undated) DEVICE — ENDOPATH 5MM CURVED SCISSORS WITH MONOPOLAR CAUTERY: Brand: ENDOPATH

## (undated) DEVICE — ARM DRAPE

## (undated) DEVICE — SUT VICRYL PLUS 0 54IN VCP287G

## (undated) DEVICE — GLOVE INDICATOR PI UNDERGLOVE SZ 8 BLUE

## (undated) DEVICE — WET SKIN PREP TRAY: Brand: MEDLINE INDUSTRIES, INC.

## (undated) DEVICE — TIP COVER ACCESSORY

## (undated) DEVICE — DRAPE EQUIPMENT RF WAND

## (undated) DEVICE — SEAL

## (undated) DEVICE — ELECTRODE LAP L WIRE E-Z CLEAN 33CM -0100

## (undated) DEVICE — SUT VLOC 90 2-0 GS-22 12 IN VLOCM2115

## (undated) DEVICE — SUT VICRYL 0 UR-6 27 IN J603H

## (undated) DEVICE — GLOVE PI ULTRA TOUCH SZ.8.0

## (undated) DEVICE — ENDOPATH 5 MM GRASPERS WITH RATCHET HANDLES: Brand: ENDOPATH

## (undated) DEVICE — GLOVE SRG BIOGEL ORTHOPEDIC 6.5

## (undated) DEVICE — 3M™ STERI-STRIP™ COMPOUND BENZOIN TINCTURE 40 BAGS/CARTON 4 CARTONS/CASE C1544: Brand: 3M™ STERI-STRIP™

## (undated) DEVICE — SUT MONOCRYL 4-0 PS-2 27 IN Y426H

## (undated) DEVICE — TOWEL SET X-RAY

## (undated) DEVICE — INTENDED FOR TISSUE SEPARATION, AND OTHER PROCEDURES THAT REQUIRE A SHARP SURGICAL BLADE TO PUNCTURE OR CUT.: Brand: BARD-PARKER SAFETY BLADES SIZE 15, STERILE

## (undated) DEVICE — ENDOPOUCH RETRIEVER SPECIMEN RETRIEVAL BAGS: Brand: ENDOPOUCH RETRIEVER

## (undated) DEVICE — SCD SEQUENTIAL COMPRESSION COMFORT SLEEVE MEDIUM KNEE LENGTH: Brand: KENDALL SCD

## (undated) DEVICE — TROCARS: Brand: KII® BALLOON BLUNT TIP SYSTEM

## (undated) DEVICE — ELECTRO LUBE IS A SINGLE PATIENT USE DEVICE THAT IS INTENDED TO BE USED ON ELECTROSURGICAL ELECTRODES TO REDUCE STICKING.: Brand: KEY SURGICAL ELECTRO LUBE

## (undated) DEVICE — SUT VLOC PBT 0 GS-21 6 IN VLOCN0306

## (undated) DEVICE — COLUMN DRAPE

## (undated) DEVICE — FORCE BIPOLAR: Brand: ENDOWRIST

## (undated) DEVICE — BASIC SINGLE BASIN 2-LF: Brand: MEDLINE INDUSTRIES, INC.

## (undated) DEVICE — LIGAMAX 5 MM ENDOSCOPIC MULTIPLE CLIP APPLIER: Brand: LIGAMAX

## (undated) DEVICE — TUBING INSUFFLATION SET ISO CONNECTOR

## (undated) DEVICE — PLUMEPEN PRO 10FT

## (undated) DEVICE — BLADELESS OBTURATOR: Brand: WECK VISTA

## (undated) DEVICE — MAYO STAND COVER: Brand: CONVERTORS

## (undated) DEVICE — IRRIG ENDO FLO TUBING

## (undated) DEVICE — SWABSTCK, BENZOIN TINCTURE, 1/PK, STRL: Brand: APLICARE

## (undated) DEVICE — [HIGH FLOW INSUFFLATOR,  DO NOT USE IF PACKAGE IS DAMAGED,  KEEP DRY,  KEEP AWAY FROM SUNLIGHT,  PROTECT FROM HEAT AND RADIOACTIVE SOURCES.]: Brand: PNEUMOSURE

## (undated) DEVICE — GAUZE SPONGES,8 PLY: Brand: CURITY

## (undated) DEVICE — MONOPOLAR CURVED SCISSORS: Brand: ENDOWRIST

## (undated) DEVICE — INSUFFLATION NEEDLE TO ESTABLISH PNEUMOPERITONEUM.: Brand: INSUFFLATION NEEDLE

## (undated) DEVICE — 3M™ STERI-STRIP™ REINFORCED ADHESIVE SKIN CLOSURES, R1546, 1/4 IN X 4 IN (6 MM X 100 MM), 10 STRIPS/ENVELOPE: Brand: 3M™ STERI-STRIP™

## (undated) DEVICE — UTILITY MARKER,BLACK WITH LABELS: Brand: DEVON

## (undated) DEVICE — ALL PURPOSE SPONGES,NON-WOVEN, 3 PLY: Brand: CURITY

## (undated) DEVICE — STRL COTTON TIP APPLCTR 6IN PK: Brand: CARDINAL HEALTH

## (undated) DEVICE — LARGE NEEDLE DRIVER: Brand: ENDOWRIST;DAVINCI SI

## (undated) DEVICE — SUT VICRYL 4-0 PS-2 18 IN J496G

## (undated) DEVICE — BLUE HEAT SCOPE WARMER

## (undated) DEVICE — ASTOUND STANDARD SURGICAL GOWN, XXL: Brand: CONVERTORS

## (undated) DEVICE — PMI DISPOSABLE PUNCTURE CLOSURE DEVICE / SUTURE GRASPER: Brand: PMI

## (undated) DEVICE — LIGHT GLOVE GREEN

## (undated) DEVICE — SUT VLOC PBT 1 GS-21 18=2 IN VLOCN0317

## (undated) DEVICE — PAD GROUNDING ADULT